# Patient Record
Sex: MALE | Race: WHITE | NOT HISPANIC OR LATINO | Employment: FULL TIME | URBAN - METROPOLITAN AREA
[De-identification: names, ages, dates, MRNs, and addresses within clinical notes are randomized per-mention and may not be internally consistent; named-entity substitution may affect disease eponyms.]

---

## 2017-01-31 ENCOUNTER — OFFICE VISIT (OUTPATIENT)
Dept: PULMONOLOGY | Facility: HOSPICE | Age: 57
End: 2017-01-31
Payer: COMMERCIAL

## 2017-01-31 ENCOUNTER — NON-PROVIDER VISIT (OUTPATIENT)
Dept: PULMONOLOGY | Facility: HOSPICE | Age: 57
End: 2017-01-31
Payer: COMMERCIAL

## 2017-01-31 VITALS
BODY MASS INDEX: 32.08 KG/M2 | HEIGHT: 75 IN | OXYGEN SATURATION: 96 % | HEART RATE: 96 BPM | WEIGHT: 258 LBS | SYSTOLIC BLOOD PRESSURE: 130 MMHG | TEMPERATURE: 98.5 F | DIASTOLIC BLOOD PRESSURE: 82 MMHG | RESPIRATION RATE: 16 BRPM

## 2017-01-31 DIAGNOSIS — F17.200 SMOKING: ICD-10-CM

## 2017-01-31 DIAGNOSIS — J44.9 CHRONIC OBSTRUCTIVE PULMONARY DISEASE, UNSPECIFIED COPD TYPE (HCC): ICD-10-CM

## 2017-01-31 DIAGNOSIS — J45.30 MILD PERSISTENT ASTHMA WITHOUT COMPLICATION: ICD-10-CM

## 2017-01-31 PROCEDURE — 99214 OFFICE O/P EST MOD 30 MIN: CPT | Performed by: INTERNAL MEDICINE

## 2017-01-31 RX ORDER — HYDROCODONE BITARTRATE AND ACETAMINOPHEN 5; 325 MG/1; MG/1
TABLET ORAL
Refills: 0 | COMMUNITY
Start: 2016-12-01 | End: 2017-10-24

## 2017-01-31 RX ORDER — ETODOLAC 400 MG/1
TABLET, FILM COATED ORAL
Refills: 1 | COMMUNITY
Start: 2016-12-01 | End: 2017-10-24

## 2017-01-31 RX ORDER — METAXALONE 800 MG/1
TABLET ORAL
Refills: 1 | COMMUNITY
Start: 2016-12-01 | End: 2017-10-24

## 2017-01-31 ASSESSMENT — PULMONARY FUNCTION TESTS
FEV1/FVC_PERCENT_PREDICTED: 76
FEV1_PREDICTED: 4.4
FEV1/FVC_PERCENT_CHANGE: -200
FVC: 4.39
FEV1/FVC_PERCENT_PREDICTED: 104
FEV1/FVC: 79.27
FEV1: 3.48
FEV1_PERCENT_PREDICTED: 79
FVC_PREDICTED: 5.76
FEV1_PERCENT_CHANGE: 2
FVC_PERCENT_PREDICTED: 77
FEV1_PERCENT_CHANGE: -1
FEV1_PERCENT_PREDICTED: 77
FVC_PERCENT_PREDICTED: 76
FEV1/FVC_PERCENT_PREDICTED: 100
FEV1/FVC: 77
FVC: 4.45
FEV1: 3.41

## 2017-01-31 NOTE — MR AVS SNAPSHOT
"        Alan Prabhakar Jr.   2017 9:00 AM   Non-Provider Visit   MRN: [id number]    Department:  [facility]   Dept Phone:  [phone]    Description:  Male : 1960   Provider:  Tristen Trinh M.D.           Reason for Visit     COPD           Allergies as of 2017     Allergen Noted Reactions    Codeine 2009   Hives, Nausea    Hydrocodone 2016   Itching    Pcn [Penicillins] 2009   Unspecified    Pt states \"It's a childhood allergies\".       You were diagnosed with     Chronic obstructive pulmonary disease, unspecified COPD type (CMS-HCC)   [3133973]         Vital Signs     Smoking Status                   Former Smoker           Basic Information     Date Of Birth Sex Race Ethnicity Preferred Language    1960 Male White Non- English      Your appointments     2017 10:20 AM   Established Patient Pul with Tristen Trinh M.D.   [facility] (--)    [address] [facility] [address]  [facility] [id number] [phone]              Problem List              ICD-10-CM Priority Class Noted - Resolved    Smoker F17.200   2012 - Present    Asthma J45.909   2012 - Present    Hypertension I10   2012 - Present    Anxiety F41.9   2012 - Present    Chronic lower back pain M54.5, G89.29   2012 - Present    Allergic rhinitis J30.9   2013 - Present    BMI 30.0-30.9,adult Z68.30   3/17/2015 - Present    Tubular adenoma of colon D12.6   2015 - Present    Pain R52   2016 - Present    Pain of upper abdomen R10.10   2016 - Present    Chest pain R07.9   2016 - Present    Gastroesophageal reflux disease without esophagitis K21.9   2016 - Present    Flank pain R10.9   2016 - Present    Hiatal hernia K44.9   2016 - Present    Nephrolithiasis N20.0   2016 - Present    COPD (chronic obstructive pulmonary disease) (CMS-HCC) J44.9   10/5/2016 - Present      Health Maintenance        Date Due " Completion Dates    IMM PNEUMOCOCCAL 19-64 (ADULT) MEDIUM RISK SERIES (1 of 1 - PPSV23) 12/2/1979 ---    COLONOSCOPY 4/18/2020 4/18/2015    IMM DTaP/Tdap/Td Vaccine (2 - Td) 3/17/2025 3/17/2015            Current Immunizations     Influenza TIV (IM) 10/11/2016    Tdap Vaccine 3/17/2015      Below and/or attached are the medications your provider expects you to take. Review all of your home medications and newly ordered medications with your provider and/or pharmacist. Follow medication instructions as directed by your provider and/or pharmacist. Please keep your medication list with you and share with your provider. Update the information when medications are discontinued, doses are changed, or new medications (including over-the-counter products) are added; and carry medication information at all times in the event of emergency situations     Allergies:  CODEINE - Hives,Nausea     HYDROCODONE - Itching     PCN - Unspecified               Medications  Valid as of: January 31, 2017 -  9:49 AM    Generic Name Brand Name Tablet Size Instructions for use    Albuterol Sulfate (Aero Soln) albuterol 108 (90 BASE) MCG/ACT Inhale 2 Puffs by mouth every 6 hours as needed for Shortness of Breath.        ALPRAZolam (Tab) XANAX 0.25 MG Take 1 Tab by mouth 1 time daily as needed for Anxiety.        Etodolac (Tab) LODINE 400 MG TK 1 T PO TID WITH FOOD        Fluticasone Propionate (Suspension) FLONASE 50 MCG/ACT Spray 1 Spray in nose every day. Each Nostril        Hydrocodone-Acetaminophen (Tab) NORCO 5-325 MG TK 1 T PO Q 4 TO 6 HOURS PRN P WHEN NOT WORKING OR DRIVING        Ibuprofen (Tab) MOTRIN 600 MG Take 600 mg by mouth every 6 hours as needed for Mild Pain.        Ipratropium-Albuterol (Solution) DUONEB 0.5-2.5 (3) MG/3ML 3 mL by Nebulization route every 6 hours as needed for Shortness of Breath.        LevoFLOXacin (Tab) LEVAQUIN 500 MG Take 1 Tab by mouth every 24 hours.        Losartan Potassium-HCTZ (Tab) HYZAAR 50-12.5  MG Take 1 Tab by mouth every evening.        Metaxalone (Tab) SKELAXIN 800 MG         Omeprazole (CAPSULE DELAYED RELEASE) PRILOSEC 20 MG TAKE ONE CAPSULE BY MOUTH TWICE DAILY        PredniSONE (Tab) DELTASONE 20 MG Take 1 Tab by mouth every day.        Sucralfate (Suspension) CARAFATE 1 GM/10ML Take 10 mL by mouth every 6 hours.        .                 Medicines prescribed today were sent to:     [facility] [id number] [place] [address] [address] [facility] OF [facility]. & [place]    [address] [facility] [address]    Phone: [phone] Fax: [phone]    Open 24 Hours?: No    [facility] [address] [facility], [place] - [address] [facility]    [address] [facility] [address]    Phone: [phone] Fax: [phone]    Open 24 Hours?: No      Medication refill instructions:       If your prescription bottle indicates you have medication refills left, it is not necessary to call your provider’s office. Please contact your pharmacy and they will refill your medication.    If your prescription bottle indicates you do not have any refills left, you may request refills at any time through one of the following ways: The online [employer] system (except Urgent Care), by calling your provider’s office, or by asking your pharmacy to contact your provider’s office with a refill request. Medication refills are processed only during regular business hours and may not be available until the next business day. Your provider may request additional information or to have a follow-up visit with you prior to refilling your medication.   *Please Note: Medication refills are assigned a new Rx number when refilled electronically. Your pharmacy may indicate that no refills were authorized even though a new prescription for the same medication is available at the pharmacy. Please request the medicine by name with the pharmacy before contacting your provider for a refill.           [employer] Access Code: Activation code  not generated  Current MyChart Status: Active

## 2017-01-31 NOTE — MR AVS SNAPSHOT
"        Alan Prabhakar JrLi   2017 10:20 AM   Office Visit   MRN: [id number]    Department:  [facility]   Dept Phone:  [phone]    Description:  Male : 1960   Provider:  Tristen Trinh M.D.           Reason for Visit     Follow-Up with PFT      Allergies as of 2017     Allergen Noted Reactions    Codeine 2009   Hives, Nausea    Hydrocodone 2016   Itching    Pcn [Penicillins] 2009   Unspecified    Pt states \"It's a childhood allergies\".       You were diagnosed with     Mild persistent asthma without complication   [952286]         Vital Signs     Blood Pressure Pulse Temperature Respirations Height Weight    130/82 mmHg 96 36.9 °C (98.5 °F) 16 1.905 m (6' 3\") 117.028 kg (258 lb)    Body Mass Index Oxygen Saturation Smoking Status             32.25 kg/m2 96% Former Smoker         Basic Information     Date Of Birth Sex Race Ethnicity Preferred Language    1960 Male White Non- English      Problem List              ICD-10-CM Priority Class Noted - Resolved    Smoker F17.200   2012 - Present    Asthma J45.909   2012 - Present    Hypertension I10   2012 - Present    Anxiety F41.9   2012 - Present    Chronic lower back pain M54.5, G89.29   2012 - Present    Allergic rhinitis J30.9   2013 - Present    BMI 30.0-30.9,adult Z68.30   3/17/2015 - Present    Tubular adenoma of colon D12.6   2015 - Present    Pain R52   2016 - Present    Pain of upper abdomen R10.10   2016 - Present    Chest pain R07.9   2016 - Present    Gastroesophageal reflux disease without esophagitis K21.9   2016 - Present    Flank pain R10.9   2016 - Present    Hiatal hernia K44.9   2016 - Present    Nephrolithiasis N20.0   2016 - Present    COPD (chronic obstructive pulmonary disease) (CMS-[facility]) J44.9   10/5/2016 - Present      Health Maintenance        Date Due Completion Dates    IMM PNEUMOCOCCAL 19-64 (ADULT) MEDIUM RISK " SERIES (1 of 1 - PPSV23) 12/2/1979 ---    COLONOSCOPY 4/18/2020 4/18/2015    IMM DTaP/Tdap/Td Vaccine (2 - Td) 3/17/2025 3/17/2015            Current Immunizations     Influenza TIV (IM) 10/11/2016    Tdap Vaccine 3/17/2015      Below and/or attached are the medications your provider expects you to take. Review all of your home medications and newly ordered medications with your provider and/or pharmacist. Follow medication instructions as directed by your provider and/or pharmacist. Please keep your medication list with you and share with your provider. Update the information when medications are discontinued, doses are changed, or new medications (including over-the-counter products) are added; and carry medication information at all times in the event of emergency situations     Allergies:  CODEINE - Hives,Nausea     HYDROCODONE - Itching     PCN - Unspecified               Medications  Valid as of: January 31, 2017 - 10:39 AM    Generic Name Brand Name Tablet Size Instructions for use    Albuterol Sulfate (Aero Soln) albuterol 108 (90 BASE) MCG/ACT Inhale 2 Puffs by mouth every 6 hours as needed for Shortness of Breath.        ALPRAZolam (Tab) XANAX 0.25 MG Take 1 Tab by mouth 1 time daily as needed for Anxiety.        Etodolac (Tab) LODINE 400 MG TK 1 T PO TID WITH FOOD        Fluticasone Propionate (Suspension) FLONASE 50 MCG/ACT Spray 1 Spray in nose every day. Each Nostril        Hydrocodone-Acetaminophen (Tab) NORCO 5-325 MG TK 1 T PO Q 4 TO 6 HOURS PRN P WHEN NOT WORKING OR DRIVING        Ibuprofen (Tab) MOTRIN 600 MG Take 600 mg by mouth every 6 hours as needed for Mild Pain.        Ipratropium-Albuterol (Solution) DUONEB 0.5-2.5 (3) MG/3ML 3 mL by Nebulization route every 6 hours as needed for Shortness of Breath.        LevoFLOXacin (Tab) LEVAQUIN 500 MG Take 1 Tab by mouth every 24 hours.        Losartan Potassium-HCTZ (Tab) HYZAAR 50-12.5 MG Take 1 Tab by mouth every evening.        Metaxalone (Tab)  SKELAXIN 800 MG         Omeprazole (CAPSULE DELAYED RELEASE) PRILOSEC 20 MG TAKE ONE CAPSULE BY MOUTH TWICE DAILY        PredniSONE (Tab) DELTASONE 20 MG Take 1 Tab by mouth every day.        Sucralfate (Suspension) CARAFATE 1 GM/10ML Take 10 mL by mouth every 6 hours.        .                 Medicines prescribed today were sent to:     [facility] [id number] [place] [address] [address] [facility] OF [facility]. & [place]    [address] [facility] [address]    Phone: [phone] Fax: [phone]    Open 24 Hours?: No    [facility] [address] [facility], [place] - [address] [facility]    [address] [facility] [address]    Phone: [phone] Fax: [phone]    Open 24 Hours?: No      Medication refill instructions:       If your prescription bottle indicates you have medication refills left, it is not necessary to call your provider’s office. Please contact your pharmacy and they will refill your medication.    If your prescription bottle indicates you do not have any refills left, you may request refills at any time through one of the following ways: The online [employer] system (except Urgent Care), by calling your provider’s office, or by asking your pharmacy to contact your provider’s office with a refill request. Medication refills are processed only during regular business hours and may not be available until the next business day. Your provider may request additional information or to have a follow-up visit with you prior to refilling your medication.   *Please Note: Medication refills are assigned a new Rx number when refilled electronically. Your pharmacy may indicate that no refills were authorized even though a new prescription for the same medication is available at the pharmacy. Please request the medicine by name with the pharmacy before contacting your provider for a refill.           [employer] Access Code: Activation code not generated  Current [employer] Status: Active

## 2017-01-31 NOTE — PROCEDURES
Technician: GINA Penn    Technician Comment:  Good patient effort & cooperation.  The results of this test meet the ATS/ERS standards for acceptability & reproducibility.  Test was performed on the [employer] Body Plethysmograph-Elite DX system.  Predicted values were [facility], [place]-3 for spirometry, [facility] for DLCO, ITS for Lung Volumes.  The DLCO was uncorrected for Hgb.  A bronchodilator of Ventolin HFA -2puffs via spacer administered.    Interpretation:    Spirometry shows mild lung restriction and there was not a significant improvement after a bronchodilator. Lung volumes are consistent with a very mild degree of air trapping. The diffusion capacity test is within normal limits. Flow volume loops are within normal limits.

## 2017-01-31 NOTE — PROGRESS NOTES
Alan Prabhakar Jr. is a 56 y.o. male here for follow-up of recent testing.    History of Present Illness:    The patient is a 56-year-old male who was admitted to the hospital for a COPD exacerbation. He has been an active smoker. He has smoked over 40 years at least one pack per day. He quit smoking about 4 weeks ago. Pulmonary function tests were done today which show mild lung restriction on spirometry but there was not a significant improvement after a bronchodilator. Lung volumes are consistent with very mild air trapping. The diffusion capacity test was normal. The patient is using duo nebs. He is not having any cough or congestion today chest pains or pleurisy. The patient reports a long time history of asthma. He's been told by many physicians in the past that he had asthma.    Constitutional:  Negative for fever, chills, sweats, and fatigue.  Eyes:  Negative for eye pain and visual changes.  HENT:  Negative for tinnitus and hoarse voice.  Cardiovascular:  Negative for chest pain, leg swelling, syncope and orthopnea.  Respiratory:  See HPI for pertinent negatives  Sleep:  Negative for somnolence, loud snoring, sleep disturbance due to breathing, insomnia.  Gastrointestinal:  Negative for dysphagia, nausea and abdominal pain.  Heme/lymph:  Denies easy bruising, blood clots.  Musculoskeletal:  Negative for arthralgias, sore muscles and back pain.  Skin:  Negative for rash and color change.  Neurological:  Negative for headaches, lightheadedness and weakness.  Psychiatric:  Denies depression.    Current Outpatient Prescriptions   Medication Sig Dispense Refill   • beclomethasone (QVAR) 80 MCG/ACT inhaler Inhale 2 Puffs by mouth 2 times a day. Use spacer. Rinse mouth after each use. 1 Inhaler 6   • etodolac (LODINE) 400 MG tablet TK 1 T PO TID WITH FOOD  1   • hydrocodone-acetaminophen (NORCO) 5-325 MG Tab per tablet TK 1 T PO Q 4 TO 6 HOURS PRN P WHEN NOT WORKING OR DRIVING  0   • metaxalone (SKELAXIN)  800 MG Tab   1   • omeprazole (PRILOSEC) 20 MG delayed-release capsule TAKE ONE CAPSULE BY MOUTH TWICE DAILY 180 Cap 0   • albuterol 108 (90 BASE) MCG/ACT Aero Soln inhalation aerosol Inhale 2 Puffs by mouth every 6 hours as needed for Shortness of Breath. 8.5 g 3   • fluticasone (FLONASE) 50 MCG/ACT nasal spray Spray 1 Spray in nose every day. Each Nostril 1 Bottle 5   • alprazolam (XANAX) 0.25 MG Tab Take 1 Tab by mouth 1 time daily as needed for Anxiety. 30 Tab 2   • losartan-hydrochlorothiazide (HYZAAR) 50-12.5 MG per tablet Take 1 Tab by mouth every evening. 90 Tab 3   • predniSONE (DELTASONE) 20 MG Tab Take 1 Tab by mouth every day. (Patient not taking: Reported on 12/8/2016) 4 Tab 0   • levofloxacin (LEVAQUIN) 500 MG tablet Take 1 Tab by mouth every 24 hours. (Patient not taking: Reported on 12/8/2016) 5 Tab 0   • sucralfate (CARAFATE) 1 GM/10ML Suspension Take 10 mL by mouth every 6 hours. 560 mL 0   • ipratropium-albuterol (DUONEB) 0.5-2.5 (3) MG/3ML nebulizer solution 3 mL by Nebulization route every 6 hours as needed for Shortness of Breath. 30 Vial 5   • ibuprofen (MOTRIN) 600 MG Tab Take 600 mg by mouth every 6 hours as needed for Mild Pain.       No current facility-administered medications for this visit.       Social History   Substance Use Topics   • Smoking status: Former Smoker -- 2.00 packs/day for 37 years     Types: Cigarettes, Cigars     Quit date: 11/08/2016   • Smokeless tobacco: Former User      Comment: from 2 pack decreased to pack a day.  Started at 17yo. Patient is smoking one cigar   • Alcohol Use: 12.6 - 13.2 oz/week     20 Standard drinks or equivalent, 1-2 Cans of beer per week       Past Medical History   Diagnosis Date   • Hypertension    • Psychiatric disorder    • ASTHMA 9/7/2012   • Hypertension 9/7/2012   • Anxiety 9/7/2012   • Chronic lower back pain 9/7/2012   • Arthritis      knee   • BMI 30.0-30.9,adult 3/17/2015   • Influenza    • Tonsillitis        Past Surgical History  "  Procedure Laterality Date   • Inj,epidural/lumb/sac single  3/5/2012     Performed by GIAN ESCOBAR at SURGERY [facility] ORS   • Tonsillectomy  2004   • Knee arthroscopy  [id number]     left   • Shoulder arthroscopy  2004     left   • Knee arthroscopy  6/19/2013     Performed by Jim Davila M.D. at SURGERY [facility] ORS   • Medial meniscectomy  6/19/2013     Performed by Jim Davila M.D. at SURGERY [facility]       Allergies:  Codeine; Hydrocodone; and Pcn    Family History   Problem Relation Age of Onset   • Hypertension     • Lung Disease Mother    • Colon Cancer Father        Physical Examination    Filed Vitals:    01/31/17 0928   Height: 1.905 m (6' 3\")   Weight: 117.028 kg (258 lb)   Weight % change since last entry.: 0 %   BP: 130/82   Pulse: 96   BMI (Calculated): 32.25   Resp: 16   Temp: 36.9 °C (98.5 °F)       Physical Exam:  Constitutional:  Well developed and well nourished.  Head:  Normocephalic and atraumatic.  Nose:  Nose normal.  Mouth/Throat:  Oropharynx is clear and moist, no lesions.    Neck:  Normal range of motion.  Supple.  No JVD.  Cardiovascular:  Normal rate, regular rhythm, normal heart sounds. No edema  Pulmonary/Chest: No wheezing, rales or rhonchi.  Respiratory effort non labored  Musculoskeletal.  No muscular atrophy.  Lymphadenopathy:  No cervical or supraclavicular adenopathy  Neurological:  Alert and oriented.  Cranial nerves intact.  No focal deficits  Skin:  No rashes or ulcers.  Psyciatric:  Normal mood and affect.    Assessment and Plan:  1. Mild persistent asthma without complication  Despite the extensive smoking history pulmonary function tests are near normal. The patient carries a history of asthma although pulmonary function tests do not show a postbronchodilator response. I suspect that he has underlying asthma and would benefit from a controller inhaler. Given the fact that he had a recent exacerbation that required hospitalization I see no reason to do " a methacholine challenge and said it was simply treat him for asthma. I started him on Qvar 2 puffs twice a day and he continues albuterol inhaler as needed. He is already quit smoking. We'll see how he does with this regimen and we'll see him back in 3 months.  - beclomethasone (QVAR) 80 MCG/ACT inhaler; Inhale 2 Puffs by mouth 2 times a day. Use spacer. Rinse mouth after each use.  Dispense: 1 Inhaler; Refill: 6    2. Smoking  The patient has an extensive smoking history greater than 30 pack years. He is a candidate for lung cancer screening.  - REFERRAL TO LUNG CANCER SCREENING PROGRAM        Followup Return in about 3 months (around 4/30/2017) for follow up with the pulmonary physician.

## 2017-01-31 NOTE — PATIENT INSTRUCTIONS
1. We have prescribed Qvar inhaler to be taken twice a day. Please use the albuterol inhaler as needed  2. Congratulations on quitting smoking  3. We have made a referral to the lung cancer screening program  4. Recommend follow-up in 3 months

## 2017-02-28 ENCOUNTER — OFFICE VISIT (OUTPATIENT)
Dept: HEMATOLOGY ONCOLOGY | Facility: MEDICAL CENTER | Age: 57
End: 2017-02-28
Payer: COMMERCIAL

## 2017-02-28 VITALS
HEART RATE: 95 BPM | SYSTOLIC BLOOD PRESSURE: 146 MMHG | WEIGHT: 265.2 LBS | TEMPERATURE: 98.6 F | OXYGEN SATURATION: 95 % | HEIGHT: 75 IN | BODY MASS INDEX: 32.97 KG/M2 | RESPIRATION RATE: 16 BRPM | DIASTOLIC BLOOD PRESSURE: 92 MMHG

## 2017-02-28 DIAGNOSIS — F17.210 CIGARETTE SMOKER: ICD-10-CM

## 2017-02-28 PROCEDURE — G0296 VISIT TO DETERM LDCT ELIG: HCPCS | Performed by: NURSE PRACTITIONER

## 2017-02-28 ASSESSMENT — PAIN SCALES - GENERAL: PAINLEVEL: NO PAIN

## 2017-02-28 ASSESSMENT — ENCOUNTER SYMPTOMS
FEVER: 0
WEIGHT LOSS: 0
CHILLS: 0
COUGH: 0
SHORTNESS OF BREATH: 0
HEMOPTYSIS: 0
WHEEZING: 0
SPUTUM PRODUCTION: 0

## 2017-02-28 NOTE — MR AVS SNAPSHOT
"Alan Prabhakar JrLi   2017 10:30 AM   Office Visit   MRN: [id number]    Department:  [facility]   Dept Phone:  [phone]    Description:  Male : 1960   Provider:  MILI Medina           Reason for Visit     Other ILCS       Allergies as of 2017     Allergen Noted Reactions    Codeine 2009   Hives, Nausea    Hydrocodone 2016   Itching    Pcn [Penicillins] 2009   Unspecified    Pt states \"It's a childhood allergies\".       Vital Signs     Blood Pressure Pulse Temperature Respirations Height Weight    146/92 mmHg 95 37 °C (98.6 °F) 16 1.905 m (6' 3\") 120.294 kg (265 lb 3.2 oz)    Body Mass Index Oxygen Saturation Smoking Status             33.15 kg/m2 95% Current Every Day Smoker         Basic Information     Date Of Birth Sex Race Ethnicity Preferred Language    1960 Male White Non- English      Problem List              ICD-10-CM Priority Class Noted - Resolved    Smoker F17.200   2012 - Present    Asthma J45.909   2012 - Present    Hypertension I10   2012 - Present    Anxiety F41.9   2012 - Present    Chronic lower back pain M54.5, G89.29   2012 - Present    Allergic rhinitis J30.9   2013 - Present    BMI 30.0-30.9,adult Z68.30   3/17/2015 - Present    Tubular adenoma of colon D12.6   2015 - Present    Pain R52   2016 - Present    Pain of upper abdomen R10.10   2016 - Present    Chest pain R07.9   2016 - Present    Gastroesophageal reflux disease without esophagitis K21.9   2016 - Present    Flank pain R10.9   2016 - Present    Hiatal hernia K44.9   2016 - Present    Nephrolithiasis N20.0   2016 - Present    COPD (chronic obstructive pulmonary disease) (CMS-HCC) J44.9   10/5/2016 - Present      Health Maintenance        Date Due Completion Dates    IMM PNEUMOCOCCAL 19-64 (ADULT) MEDIUM RISK SERIES (1 of 1 - PPSV23) 1979 ---    COLONOSCOPY 2020    IMM " DTaP/Tdap/Td Vaccine (2 - Td) 3/17/2025 3/17/2015            Current Immunizations     Influenza TIV (IM) 10/11/2016    Tdap Vaccine 3/17/2015      Below and/or attached are the medications your provider expects you to take. Review all of your home medications and newly ordered medications with your provider and/or pharmacist. Follow medication instructions as directed by your provider and/or pharmacist. Please keep your medication list with you and share with your provider. Update the information when medications are discontinued, doses are changed, or new medications (including over-the-counter products) are added; and carry medication information at all times in the event of emergency situations     Allergies:  CODEINE - Hives,Nausea     HYDROCODONE - Itching     PCN - Unspecified               Medications  Valid as of: February 28, 2017 - 10:54 AM    Generic Name Brand Name Tablet Size Instructions for use    Albuterol Sulfate (Aero Soln) albuterol 108 (90 BASE) MCG/ACT Inhale 2 Puffs by mouth every 6 hours as needed for Shortness of Breath.        ALPRAZolam (Tab) XANAX 0.25 MG Take 1 Tab by mouth 1 time daily as needed for Anxiety.        Beclomethasone Dipropionate (Aero Soln) QVAR 80 MCG/ACT Inhale 2 Puffs by mouth 2 times a day. Use spacer. Rinse mouth after each use.        Etodolac (Tab) LODINE 400 MG TK 1 T PO TID WITH FOOD        Fluticasone Propionate (Suspension) FLONASE 50 MCG/ACT Spray 1 Spray in nose every day. Each Nostril        Hydrocodone-Acetaminophen (Tab) NORCO 5-325 MG TK 1 T PO Q 4 TO 6 HOURS PRN P WHEN NOT WORKING OR DRIVING        Ibuprofen (Tab) MOTRIN 600 MG Take 600 mg by mouth every 6 hours as needed for Mild Pain.        Ipratropium-Albuterol (Solution) DUONEB 0.5-2.5 (3) MG/3ML 3 mL by Nebulization route every 6 hours as needed for Shortness of Breath.        LevoFLOXacin (Tab) LEVAQUIN 500 MG Take 1 Tab by mouth every 24 hours.        Losartan Potassium-HCTZ (Tab) HYZAAR 50-12.5  MG Take 1 Tab by mouth every evening.        Metaxalone (Tab) SKELAXIN 800 MG         Omeprazole (CAPSULE DELAYED RELEASE) PRILOSEC 20 MG TAKE ONE CAPSULE BY MOUTH TWICE DAILY        PredniSONE (Tab) DELTASONE 20 MG Take 1 Tab by mouth every day.        Sucralfate (Suspension) CARAFATE 1 GM/10ML Take 10 mL by mouth every 6 hours.        .                 Medicines prescribed today were sent to:     [facility] [id number] [place] [address] [address] [facility] [address] [facility]. & [place]    [address] [facility] [address]    Phone: [phone] Fax: [phone]    Open 24 Hours?: No    [facility] [address] [facility], [place] [address] [facility]    [address] [facility] [address]    Phone: [phone] Fax: [phone]    Open 24 Hours?: No      Medication refill instructions:       If your prescription bottle indicates you have medication refills left, it is not necessary to call your provider’s office. Please contact your pharmacy and they will refill your medication.    If your prescription bottle indicates you do not have any refills left, you may request refills at any time through one of the following ways: The online [employer] system (except Urgent Care), by calling your provider’s office, or by asking your pharmacy to contact your provider’s office with a refill request. Medication refills are processed only during regular business hours and may not be available until the next business day. Your provider may request additional information or to have a follow-up visit with you prior to refilling your medication.   *Please Note: Medication refills are assigned a new Rx number when refilled electronically. Your pharmacy may indicate that no refills were authorized even though a new prescription for the same medication is available at the pharmacy. Please request the medicine by name with the pharmacy before contacting your provider for a refill.           [employer] Access Code: Activation code  not generated  Current MyChart Status: Active

## 2017-02-28 NOTE — PROGRESS NOTES
"Subjective:   Date of Consultation:2/28/2017 10:23 AM  Primary care physician:MILI Ellis    Patient seen today for initial lung cancer screening visit. Patient referred by Dr. Tristen Trinh.    The patient meets eligibility criteria including age, smoking history (30+ pack years), if former smoker, quit in the last 15 years, and absence of signs or symptoms of lung cancer.    - Age - 56  - Smoking history - Patient has smoked for 36 years at an average of 2 ppd = 80 pack year smoking history.  - Current smoking status - Current smoker. Patient is trying to quit.  - No symptoms of lung cancer and no previous history of lung cancer   Past Medical History   Diagnosis Date   • Hypertension    • Psychiatric disorder    • ASTHMA 9/7/2012   • Hypertension 9/7/2012   • Anxiety 9/7/2012   • Chronic lower back pain 9/7/2012   • Arthritis      knee   • BMI 30.0-30.9,adult 3/17/2015   • Influenza    • Tonsillitis      Past Surgical History   Procedure Laterality Date   • Inj,epidural/lumb/sac single  3/5/2012     Performed by GIAN ESCOBAR [facility]   • Tonsillectomy  2004   • Knee arthroscopy  [id number]     left   • Shoulder arthroscopy  2004     left   • Knee arthroscopy  6/19/2013     Performed by Jim Davila M.D. at [facility] ORS   • Medial meniscectomy  6/19/2013     Performed by Jim Davila M.D. at [facility] ORS     Allergies   Allergen Reactions   • Codeine Hives and Nausea   • Hydrocodone Itching   • Pcn [Penicillins] Unspecified     Pt states \"It's a childhood allergies\".         History   Smoking status   • Former Smoker -- 2.00 packs/day for 37 years   • Types: Cigarettes, Cigars   • Quit date: 11/08/2016   Smokeless tobacco   • Former User     Comment: from 2 pack decreased to pack a day.  Started at 15yo. Patient is smoking one cigar     History   Alcohol Use   • 12.6 - 13.2 oz/week   • 20 Standard drinks or equivalent, 1-2 Cans of beer per week "     History   Drug Use No          Family History   Problem Relation Age of Onset   • Hypertension     • Lung Disease Mother    • Colon Cancer Father        Review of Systems   Constitutional: Negative for fever, chills, weight loss and malaise/fatigue.   Respiratory: Negative for cough, hemoptysis, sputum production, shortness of breath and wheezing.         Objective:   There were no vitals taken for this visit.    Physical Exam   Constitutional: He is oriented to person, place, and time. No distress.   Patient not in acute distress   HENT:   Head: Normocephalic and atraumatic.   Mouth/Throat: Oropharynx is clear and moist and mucous membranes are normal. No oral lesions.   Eyes: EOM are normal. Pupils are equal, round, and reactive to light.   Neck: Normal range of motion. Neck supple.   Cardiovascular: Normal rate and regular rhythm.  Exam reveals no gallop and no friction rub.    Pulmonary/Chest: Effort normal and breath sounds normal. He has no decreased breath sounds. He has no wheezes.   Abdominal: Soft. Bowel sounds are normal. He exhibits no distension and no mass. There is no hepatosplenomegaly, splenomegaly or hepatomegaly. There is no tenderness.   Musculoskeletal: Normal range of motion. He exhibits no edema.   Lymphadenopathy:     He has no cervical adenopathy.     He has no axillary adenopathy.        Right: No inguinal and no supraclavicular adenopathy present.        Left: No inguinal and no supraclavicular adenopathy present.   Neurological: He is alert and oriented to person, place, and time. He has normal strength. No cranial nerve deficit. Gait normal.   Skin: He is not diaphoretic. No cyanosis.   Nursing note and vitals reviewed.      Assessment:     1. Cigarette smoker       Medical Decision Making:  Today's Assessment / Status / Plan:             We conducted a shared decision-making process using a decision aid. We reviewed benefits and harms of screening, including false positives and  potential need for additional diagnostic testing, the possibility of over diagnosis, and total radiation exposure.    We discussed the importance of adhering to annual LDCT screening. We also discussed the impact of comorbities on the patient's the ability or willingness to undergo diagnostic procedure(s) and treatment.    Counseling on the importance of maintaining cigarette smoking abstinence if former smoker; or the importance of smoking cessation if current smoker and, if appropriate, furnishing of information about tobacco cessation interventions.    Based on our discussion, we have decided to begin annual lung cancer screening starting now.

## 2017-03-09 ENCOUNTER — HOSPITAL ENCOUNTER (OUTPATIENT)
Dept: RADIOLOGY | Facility: MEDICAL CENTER | Age: 57
End: 2017-03-09
Attending: NURSE PRACTITIONER
Payer: COMMERCIAL

## 2017-03-09 DIAGNOSIS — F17.210 CIGARETTE SMOKER: ICD-10-CM

## 2017-03-09 PROCEDURE — G0297 LDCT FOR LUNG CA SCREEN: HCPCS

## 2017-03-13 ENCOUNTER — TELEPHONE (OUTPATIENT)
Dept: HEMATOLOGY ONCOLOGY | Facility: MEDICAL CENTER | Age: 57
End: 2017-03-13

## 2017-03-31 RX ORDER — OMEPRAZOLE 20 MG/1
CAPSULE, DELAYED RELEASE ORAL
Qty: 180 CAP | Refills: 1 | Status: SHIPPED | OUTPATIENT
Start: 2017-03-31 | End: 2017-09-21 | Stop reason: SDUPTHER

## 2017-04-13 ENCOUNTER — OFFICE VISIT (OUTPATIENT)
Dept: MEDICAL GROUP | Facility: PHYSICIAN GROUP | Age: 57
End: 2017-04-13
Payer: COMMERCIAL

## 2017-04-13 VITALS
BODY MASS INDEX: 32.28 KG/M2 | DIASTOLIC BLOOD PRESSURE: 82 MMHG | SYSTOLIC BLOOD PRESSURE: 148 MMHG | HEIGHT: 75 IN | TEMPERATURE: 98.1 F | RESPIRATION RATE: 16 BRPM | OXYGEN SATURATION: 98 % | WEIGHT: 259.6 LBS | HEART RATE: 100 BPM

## 2017-04-13 DIAGNOSIS — F41.9 ANXIETY: ICD-10-CM

## 2017-04-13 DIAGNOSIS — G47.00 INSOMNIA, UNSPECIFIED TYPE: ICD-10-CM

## 2017-04-13 DIAGNOSIS — I10 ESSENTIAL HYPERTENSION: Chronic | ICD-10-CM

## 2017-04-13 DIAGNOSIS — R52 PAIN: ICD-10-CM

## 2017-04-13 DIAGNOSIS — J30.9 ALLERGIC RHINITIS, UNSPECIFIED ALLERGIC RHINITIS TRIGGER, UNSPECIFIED RHINITIS SEASONALITY: ICD-10-CM

## 2017-04-13 DIAGNOSIS — E66.9 OBESITY (BMI 30-39.9): ICD-10-CM

## 2017-04-13 PROCEDURE — 99214 OFFICE O/P EST MOD 30 MIN: CPT | Performed by: NURSE PRACTITIONER

## 2017-04-13 RX ORDER — LOSARTAN POTASSIUM AND HYDROCHLOROTHIAZIDE 12.5; 5 MG/1; MG/1
1 TABLET ORAL EVERY EVENING
Qty: 90 TAB | Refills: 3 | Status: SHIPPED | OUTPATIENT
Start: 2017-04-13 | End: 2018-04-16 | Stop reason: SDUPTHER

## 2017-04-13 RX ORDER — HYDROXYZINE HYDROCHLORIDE 25 MG/1
50 TABLET, FILM COATED ORAL
Qty: 30 TAB | Refills: 1 | Status: SHIPPED | OUTPATIENT
Start: 2017-04-13 | End: 2017-10-24

## 2017-04-13 RX ORDER — TRIAMCINOLONE ACETONIDE 40 MG/ML
40 INJECTION, SUSPENSION INTRA-ARTICULAR; INTRAMUSCULAR ONCE
Status: COMPLETED | OUTPATIENT
Start: 2017-04-13 | End: 2017-04-13

## 2017-04-13 RX ORDER — ALPRAZOLAM 0.25 MG/1
0.25 TABLET ORAL
Qty: 30 TAB | Refills: 2 | Status: SHIPPED | OUTPATIENT
Start: 2017-04-13 | End: 2017-10-24 | Stop reason: SDUPTHER

## 2017-04-13 RX ADMIN — TRIAMCINOLONE ACETONIDE 40 MG: 40 INJECTION, SUSPENSION INTRA-ARTICULAR; INTRAMUSCULAR at 13:37

## 2017-04-13 NOTE — ASSESSMENT & PLAN NOTE
Patient has chronic back pain and is currently under the care of pain management. He states he is unable to sleep at night due to back pain and feels that he is a walking zombie. He has tried Ambien in the past and does not want narcotic are addictive medications. He had a recent work injury and is going to get epidurals in the thoracic spine. He is requesting a new referral to treat lower back pain as he has not had success with epidurals done by Dr. Morales. He is currently being treated by Dr. Jefferson for a Workman's Comp. claimfor upper back pain.He is going to email me next week for referral for the lower back pain, after consulting with Dr. Jefferson.

## 2017-04-13 NOTE — PROGRESS NOTES
Subjective:     Chief Complaint   Patient presents with   • Medication Refill   • Injections     Kenalog        HPI:  Alan Prabhakar Jr. is a 56 y.o. male here today to discuss the following:    Allergic rhinitis  Patient has chronic environmental allergies and uses Flonase spray is here today for Kenalog. Symptoms include runny nose, sneezing and itching watery eyes.     Anxiety  Chronic condition: Patient was treated for chronic anxiety with Xanax that he usually takes at bedtime. He denies any chest pain, shortness of breath, panic attacks, suicidal or homicidal ideation. He is requesting refills today.    BMI 30.0-30.9,adult  Patient is obese with a BMI of 32.55. He spends a lot of time driving and sedentary.    COPD (chronic obstructive pulmonary disease) (CMS-[facility])  Managed by pulmonary medicine    Pain  Patient has chronic back pain and is currently under the care of pain management. He states he is unable to sleep at night due to back pain and feels that he is a walking zombie. He has tried Ambien in the past and does not want narcotic are addictive medications. He had a recent work injury and is going to get epidurals in the thoracic spine. He is requesting a new referral to treat lower back pain as he has not had success with epidurals done by Dr. Morales. He is currently being treated by Dr. Jefferson for a Workman's Comp. claimfor upper back pain.He is going to email me next week for referral for the lower back pain, after consulting with Dr. Jefferson.         Current medicines (including changes today)  Current Outpatient Prescriptions   Medication Sig Dispense Refill   • GABAPENTIN PO Take  by mouth.     • Cyclobenzaprine HCl (FLEXERIL PO) Take  by mouth.     • hydrOXYzine (ATARAX) 25 MG Tab Take 2 Tabs by mouth every bedtime. 30 Tab 1   • alprazolam (XANAX) 0.25 MG Tab Take 1 Tab by mouth 1 time daily as needed for Anxiety. 30 Tab 2   • losartan-hydrochlorothiazide (HYZAAR) 50-12.5 MG per tablet Take 1  Tab by mouth every evening. 90 Tab 3   • omeprazole (PRILOSEC) 20 MG delayed-release capsule TAKE 1 CAPSULE BY MOUTH TWICE DAILY 180 Cap 1   • etodolac (LODINE) 400 MG tablet TK 1 T PO TID WITH FOOD  1   • hydrocodone-acetaminophen (NORCO) 5-325 MG Tab per tablet TK 1 T PO Q 4 TO 6 HOURS PRN P WHEN NOT WORKING OR DRIVING  0   • metaxalone (SKELAXIN) 800 MG Tab   1   • beclomethasone (QVAR) 80 MCG/ACT inhaler Inhale 2 Puffs by mouth 2 times a day. Use spacer. Rinse mouth after each use. 1 Inhaler 6   • albuterol 108 (90 BASE) MCG/ACT Aero Soln inhalation aerosol Inhale 2 Puffs by mouth every 6 hours as needed for Shortness of Breath. 8.5 g 3   • fluticasone (FLONASE) 50 MCG/ACT nasal spray Spray 1 Spray in nose every day. Each Nostril 1 Bottle 5   • sucralfate (CARAFATE) 1 GM/10ML Suspension Take 10 mL by mouth every 6 hours. 560 mL 0   • ipratropium-albuterol (DUONEB) 0.5-2.5 (3) MG/3ML nebulizer solution 3 mL by Nebulization route every 6 hours as needed for Shortness of Breath. 30 Vial 5   • ibuprofen (MOTRIN) 600 MG Tab Take 600 mg by mouth every 6 hours as needed for Mild Pain.       No current facility-administered medications for this visit.       He  has a past medical history of Hypertension; Psychiatric disorder; ASTHMA (9/7/2012); Hypertension (9/7/2012); Anxiety (9/7/2012); Chronic lower back pain (9/7/2012); Arthritis; BMI 30.0-30.9,adult (3/17/2015); Influenza; and Tonsillitis.    ROS   Review of Systems   Constitutional: Negative for fever, chills, weight loss and malaise/fatigue.   HENT: Negative for ear pain, nosebleeds, congestion, sore throat and neck pain.    Respiratory: Negative for cough, sputum production, shortness of breath and wheezing.    Cardiovascular: Negative for chest pain, palpitations,  and leg swelling.   Gastrointestinal: Negative for heartburn, nausea, vomiting, diarrhea and abdominal pain.   MS: Positive for chronic back pain  Neurological: Negative for dizziness, tingling,  "tremors, sensory change, focal weakness and headaches.   Psychiatric/Behavioral: Negative for depression,  suicidal ideas, and memory loss.  Positive fake satiety and insomnia  All other systems reviewed and are negative except as in HPI.     Objective:   Physical Exam:  Blood pressure 148/82, pulse 100, temperature 36.7 °C (98.1 °F), resp. rate 16, height 1.905 m (6' 3\"), weight 117.754 kg (259 lb 9.6 oz), SpO2 98 %. Body mass index is 32.45 kg/(m^2).   Physical Exam:  Alert, oriented in no acute distress.  Eye contact is good, speech goal directed, affect calm  HEENT: conjunctiva non-injected, sclera non-icteric.  Pinna normal.   Neck No adenopathy or masses in the neck or supraclavicular regions.  Lungs: clear to auscultation bilaterally with good excursion.  CV: regular rate and rhythm.   Abdomen: soft, nontender, No CVAT. Normal bowel sounds.  Ext: no edema, color normal, vascularity normal, temperature normal  MS: Normal gait and station    Assessment and Plan:   The following treatment plan was discussed   1. Insomnia, unspecified type  hydrOXYzine (ATARAX) 25 MG Tab   2. Allergic rhinitis, unspecified allergic rhinitis trigger, unspecified rhinitis seasonality  triamcinolone acetonide (KENALOG-40) injection 40 mg   3. Anxiety  alprazolam (XANAX) 0.25 MG Tab    Continue current medication   4. Essential hypertension  losartan-hydrochlorothiazide (HYZAAR) 50-12.5 MG per tablet    Continue current medication.   5. Obesity (BMI 30-39.9)  Patient identified as having weight management issue.  Appropriate orders and counseling given.   6. BMI 30.0-30.9,adult     7. Pain      patient is given a trial hydroxyzine for anxiety and sleep.    Followup: Return in about 6 months (around 10/13/2017).   Please note that this dictation was created using voice recognition software. I have made every reasonable attempt to correct obvious errors, but I expect that there are errors of grammar and possibly content that I did not " discover before finalizing the note.

## 2017-04-13 NOTE — ASSESSMENT & PLAN NOTE
Chronic condition: Patient was treated for chronic anxiety with Xanax that he usually takes at bedtime. He denies any chest pain, shortness of breath, panic attacks, suicidal or homicidal ideation. He is requesting refills today.

## 2017-04-13 NOTE — MR AVS SNAPSHOT
"        Alan Prabhakar Jr.   2017 1:00 PM   Office Visit   MRN: [id number]    Department:  [facility]   Dept Phone:  [phone]    Description:  Male : 1960   Provider:  MILI Ellis           Reason for Visit     Medication Refill     Injections Kenalog       Allergies as of 2017     Allergen Noted Reactions    Codeine 2009   Hives, Nausea    Hydrocodone 2016   Itching    Pcn [Penicillins] 2009   Unspecified    Pt states \"It's a childhood allergies\".       You were diagnosed with     Insomnia, unspecified type   [7264208]       Allergic rhinitis, unspecified allergic rhinitis trigger, unspecified rhinitis seasonality   [6168637]       Anxiety   [856279]   Continue current medication    Essential hypertension   [1403258]   Continue current medication.    Obesity (BMI 30-39.9)   [123530]         Vital Signs     Blood Pressure Pulse Temperature Respirations Height Weight    148/82 mmHg 100 36.7 °C (98.1 °F) 16 1.905 m (6' 3\") 117.754 kg (259 lb 9.6 oz)    Body Mass Index Oxygen Saturation Smoking Status             32.45 kg/m2 98% Current Every Day Smoker         Basic Information     Date Of Birth Sex Race Ethnicity Preferred Language    1960 Male White Non- English      Problem List              ICD-10-CM Priority Class Noted - Resolved    Smoker F17.200   2012 - Present    Asthma J45.909   2012 - Present    Hypertension I10   2012 - Present    Anxiety F41.9   2012 - Present    Chronic lower back pain M54.5, G89.29   2012 - Present    Allergic rhinitis J30.9   2013 - Present    BMI 30.0-30.9,adult Z68.30   3/17/2015 - Present    Tubular adenoma of colon D12.6   2015 - Present    Pain R52   2016 - Present    Pain of upper abdomen R10.10   2016 - Present    Chest pain R07.9   2016 - Present    Gastroesophageal reflux disease without esophagitis K21.9   2016 - Present    Flank pain R10.9 "   9/22/2016 - Present    Hiatal hernia K44.9   9/27/2016 - Present    Nephrolithiasis N20.0   9/27/2016 - Present    COPD (chronic obstructive pulmonary disease) (CMS-[facility]) J44.9   10/5/2016 - Present    Cigarette smoker F17.210   2/28/2017 - Present    Obesity (BMI 30-39.9) E66.9   4/13/2017 - Present      Health Maintenance        Date Due Completion Dates    IMM PNEUMOCOCCAL 19-64 (ADULT) MEDIUM RISK SERIES (1 of 1 - PPSV23) 12/2/1979 ---    COLONOSCOPY 4/18/2020 4/18/2015    IMM DTaP/Tdap/Td Vaccine (2 - Td) 3/17/2025 3/17/2015            Current Immunizations     Influenza TIV (IM) 10/11/2016    Tdap Vaccine 3/17/2015      Below and/or attached are the medications your provider expects you to take. Review all of your home medications and newly ordered medications with your provider and/or pharmacist. Follow medication instructions as directed by your provider and/or pharmacist. Please keep your medication list with you and share with your provider. Update the information when medications are discontinued, doses are changed, or new medications (including over-the-counter products) are added; and carry medication information at all times in the event of emergency situations     Allergies:  CODEINE - Hives,Nausea     HYDROCODONE - Itching     PCN - Unspecified               Medications  Valid as of: April 13, 2017 -  1:39 PM    Generic Name Brand Name Tablet Size Instructions for use    Albuterol Sulfate (Aero Soln) albuterol 108 (90 BASE) MCG/ACT Inhale 2 Puffs by mouth every 6 hours as needed for Shortness of Breath.        ALPRAZolam (Tab) XANAX 0.25 MG Take 1 Tab by mouth 1 time daily as needed for Anxiety.        Beclomethasone Dipropionate (Aero Soln) QVAR 80 MCG/ACT Inhale 2 Puffs by mouth 2 times a day. Use spacer. Rinse mouth after each use.        Cyclobenzaprine HCl   Take  by mouth.        Etodolac (Tab) LODINE 400 MG TK 1 T PO TID WITH FOOD        Fluticasone Propionate (Suspension) FLONASE 50 MCG/ACT  Spray 1 Spray in nose every day. Each Nostril        Gabapentin   Take  by mouth.        Hydrocodone-Acetaminophen (Tab) NORCO 5-325 MG TK 1 T PO Q 4 TO 6 HOURS PRN P WHEN NOT WORKING OR DRIVING        HydrOXYzine HCl (Tab) ATARAX 25 MG Take 2 Tabs by mouth every bedtime.        Ibuprofen (Tab) MOTRIN 600 MG Take 600 mg by mouth every 6 hours as needed for Mild Pain.        Ipratropium-Albuterol (Solution) DUONEB 0.5-2.5 (3) MG/3ML 3 mL by Nebulization route every 6 hours as needed for Shortness of Breath.        Losartan Potassium-HCTZ (Tab) HYZAAR 50-12.5 MG Take 1 Tab by mouth every evening.        Metaxalone (Tab) SKELAXIN 800 MG         Omeprazole (CAPSULE DELAYED RELEASE) PRILOSEC 20 MG TAKE 1 CAPSULE BY MOUTH TWICE DAILY        Sucralfate (Suspension) CARAFATE 1 GM/10ML Take 10 mL by mouth every 6 hours.        .                 Medicines prescribed today were sent to:     [facility] [address] [place] [address] AT [facility]    [address] [facility] [address]    Phone: [phone] Fax: [phone]    Open 24 Hours?: No    [facility] [address] [facility] [address] [facility]    [address] [facility] [address]    Phone: [phone] Fax: [phone]    Open 24 Hours?: No      Medication refill instructions:       If your prescription bottle indicates you have medication refills left, it is not necessary to call your provider’s office. Please contact your pharmacy and they will refill your medication.    If your prescription bottle indicates you do not have any refills left, you may request refills at any time through one of the following ways: The online [employer] system (except Urgent Care), by calling your provider’s office, or by asking your pharmacy to contact your provider’s office with a refill request. Medication refills are processed only during regular business hours and may not be available until the next business day. Your provider may  request additional information or to have a follow-up visit with you prior to refilling your medication.   *Please Note: Medication refills are assigned a new Rx number when refilled electronically. Your pharmacy may indicate that no refills were authorized even though a new prescription for the same medication is available at the pharmacy. Please request the medicine by name with the pharmacy before contacting your provider for a refill.           MyChart Access Code: Activation code not generated  Current MyChart Status: Active          Quit Tobacco Information     Do you want to quit using tobacco?    Quitting tobacco decreases risks of cancer, heart and lung disease, increases life expectancy, improves sense of taste and smell, and increases spending money, among other benefits.    If you are thinking about quitting, we can help.  • [facility] Quit Tobacco Program: 225.976.7795  o Program occurs weekly for four weeks and includes pharmacist consultation on products to support quitting smoking or chewing tobacco. A provider referral is needed for pharmacist consultation.  • Tobacco Users Help Hotline: 5-853-QUIT-NOW (448-2003) or https://[place].quitlogix.org/  o Free, confidential telephone and online coaching for [place] residents. Sessions are designed on a schedule that is convenient for you. Eligible clients receive free nicotine replacement therapy.  • Nationally: www.smokefree.gov  o Information and professional assistance to support both immediate and long-term needs as you become, and remain, a non-smoker. Smokefree.gov allows you to choose the help that best fits your needs.

## 2017-04-13 NOTE — ASSESSMENT & PLAN NOTE
Patient has chronic environmental allergies and uses Flonase spray is here today for Kenalog. Symptoms include runny nose, sneezing and itching watery eyes.

## 2017-09-22 NOTE — TELEPHONE ENCOUNTER
Was the patient seen in the last year in this department? Yes     Does patient have an active prescription for medications requested? No     Received Request Via: Pharmacy      Pt met protocol?: Yes    LAST OV 04/13/2017

## 2017-09-24 RX ORDER — OMEPRAZOLE 20 MG/1
CAPSULE, DELAYED RELEASE ORAL
Qty: 180 CAP | Refills: 1 | Status: SHIPPED | OUTPATIENT
Start: 2017-09-24 | End: 2018-06-20 | Stop reason: SDUPTHER

## 2017-10-12 DIAGNOSIS — J30.1 ALLERGIC RHINITIS DUE TO POLLEN, UNSPECIFIED CHRONICITY, UNSPECIFIED SEASONALITY: ICD-10-CM

## 2017-10-12 DIAGNOSIS — J30.1 ALLERGIC RHINITIS DUE TO POLLEN: ICD-10-CM

## 2017-10-13 NOTE — TELEPHONE ENCOUNTER
Was the patient seen in the last year in this department? Yes     Does patient have an active prescription for medications requested? No     Received Request Via: Pharmacy      Pt met protocol?: Yes     Last OV 04/2017

## 2017-10-16 RX ORDER — FLUTICASONE PROPIONATE 50 MCG
SPRAY, SUSPENSION (ML) NASAL
Qty: 3 BOTTLE | Refills: 1 | Status: SHIPPED | OUTPATIENT
Start: 2017-10-16 | End: 2023-02-25

## 2017-10-24 ENCOUNTER — OFFICE VISIT (OUTPATIENT)
Dept: MEDICAL GROUP | Facility: PHYSICIAN GROUP | Age: 57
End: 2017-10-24
Payer: COMMERCIAL

## 2017-10-24 VITALS
OXYGEN SATURATION: 96 % | DIASTOLIC BLOOD PRESSURE: 88 MMHG | TEMPERATURE: 98.1 F | BODY MASS INDEX: 31.71 KG/M2 | RESPIRATION RATE: 16 BRPM | SYSTOLIC BLOOD PRESSURE: 126 MMHG | HEIGHT: 75 IN | HEART RATE: 110 BPM | WEIGHT: 255 LBS

## 2017-10-24 DIAGNOSIS — J30.9 ACUTE ALLERGIC RHINITIS, UNSPECIFIED SEASONALITY, UNSPECIFIED TRIGGER: ICD-10-CM

## 2017-10-24 DIAGNOSIS — F17.200 SMOKER: ICD-10-CM

## 2017-10-24 DIAGNOSIS — J45.30 MILD PERSISTENT ASTHMA WITHOUT COMPLICATION: ICD-10-CM

## 2017-10-24 DIAGNOSIS — L91.8 CUTANEOUS SKIN TAGS: ICD-10-CM

## 2017-10-24 DIAGNOSIS — F41.9 ANXIETY: ICD-10-CM

## 2017-10-24 DIAGNOSIS — I10 ESSENTIAL HYPERTENSION: ICD-10-CM

## 2017-10-24 DIAGNOSIS — G89.29 CHRONIC BILATERAL LOW BACK PAIN WITH RIGHT-SIDED SCIATICA: ICD-10-CM

## 2017-10-24 DIAGNOSIS — Z13.6 SCREENING FOR CARDIOVASCULAR, RESPIRATORY, AND GENITOURINARY DISEASES: ICD-10-CM

## 2017-10-24 DIAGNOSIS — M54.41 CHRONIC BILATERAL LOW BACK PAIN WITH RIGHT-SIDED SCIATICA: ICD-10-CM

## 2017-10-24 DIAGNOSIS — Z13.83 SCREENING FOR CARDIOVASCULAR, RESPIRATORY, AND GENITOURINARY DISEASES: ICD-10-CM

## 2017-10-24 DIAGNOSIS — Z13.89 SCREENING FOR CARDIOVASCULAR, RESPIRATORY, AND GENITOURINARY DISEASES: ICD-10-CM

## 2017-10-24 PROCEDURE — 99214 OFFICE O/P EST MOD 30 MIN: CPT | Mod: 25 | Performed by: NURSE PRACTITIONER

## 2017-10-24 PROCEDURE — 11200 RMVL SKIN TAGS UP TO&INC 15: CPT | Performed by: NURSE PRACTITIONER

## 2017-10-24 RX ORDER — TRIAMCINOLONE ACETONIDE 40 MG/ML
40 INJECTION, SUSPENSION INTRA-ARTICULAR; INTRAMUSCULAR ONCE
Status: COMPLETED | OUTPATIENT
Start: 2017-10-24 | End: 2017-10-24

## 2017-10-24 RX ORDER — ALPRAZOLAM 0.25 MG/1
0.25 TABLET ORAL
Qty: 30 TAB | Refills: 2 | Status: SHIPPED | OUTPATIENT
Start: 2017-10-24 | End: 2023-02-25

## 2017-10-24 RX ADMIN — TRIAMCINOLONE ACETONIDE 40 MG: 40 INJECTION, SUSPENSION INTRA-ARTICULAR; INTRAMUSCULAR at 16:27

## 2017-10-24 ASSESSMENT — PATIENT HEALTH QUESTIONNAIRE - PHQ9: CLINICAL INTERPRETATION OF PHQ2 SCORE: 0

## 2017-10-24 NOTE — ASSESSMENT & PLAN NOTE
Chronic condition: patient has been treated for chronic anxiety with Xanax. He states that he usually takes it at bedtime every 4-5th night. He denies any recent panic attacks, suicidal or homicidal ideation. He is requesting refills today.

## 2017-10-24 NOTE — ASSESSMENT & PLAN NOTE
Patient is currently smoking. He states that he is not smoking as much as he is used to because he has been on light duty, and he smokes more when he is working.

## 2017-10-24 NOTE — ASSESSMENT & PLAN NOTE
Patient has chronic environmental allergies and currently uses Flonase as needed. Symptoms included many nose, sneezing and watery eyes. He is requesting a  Kenalog injection today

## 2017-10-24 NOTE — ASSESSMENT & PLAN NOTE
Patient has chronic low back pain with sciatic down right leg to ankle. He is under the care of Dr. Jefferson at pain management. This is a workman's comp claim. He is requesting lab work to check his kidneys and liver due to various medications that he's been taking.

## 2017-10-24 NOTE — ASSESSMENT & PLAN NOTE
Chronic condition: Patient is treated for hypertension with losartan-HCTZ 50-12 0.5 mg daily patient is well controlled. Blood pressure today is 126/88.  He denies any chest pain, diaphoresis, shortness of breath, headaches, dizziness or blurred vision.

## 2017-10-24 NOTE — PROGRESS NOTES
Subjective:     Chief Complaint   Patient presents with   • Orders Needed     labs    • Injections     Kenalog    • Nevus       HPI:  Alan Prabhakar Jr. is a 56 y.o. male here today to discuss the following:    Chronic lower back pain  Patient has chronic low back pain with sciatic down right leg to ankle. He is under the care of Dr. Jefferson at pain management. This is a workman's comp claim. He is requesting lab work to check his kidneys and liver due to various medications that he's been taking.    Anxiety  Chronic condition: patient has been treated for chronic anxiety with Xanax. He states that he usually takes it at bedtime every 4-5th night. He denies any recent panic attacks, suicidal or homicidal ideation. He is requesting refills today.    Hypertension  Chronic condition: Patient is treated for hypertension with losartan-HCTZ 50-12 0.5 mg daily patient is well controlled. Blood pressure today is 126/88.  He denies any chest pain, diaphoresis, shortness of breath, headaches, dizziness or blurred vision.     Cutaneous skin tags  Patient has multiple cutaneous skin tags he would like removed, due to irritation by his collar.    Allergic rhinitis  Patient has chronic environmental allergies and currently uses Flonase as needed. Symptoms included many nose, sneezing and watery eyes. He is requesting a  Kenalog injection today    Smoker  Patient is currently smoking. He states that he is not smoking as much as he is used to because he has been on light duty, and he smokes more when he is working.    Asthma  Patient had asthma for many years managed by pulmonology. He states that he recently had testing done that indicated he does not have COPD asthma is exacerbated by allergies and he is currently doing okay. He denies chest pain, shortness of breath or dyspnea. He is not taking medication on regular basis even now he has it in his possession.         Current medicines (including changes today)  Current  Outpatient Prescriptions   Medication Sig Dispense Refill   • alprazolam (XANAX) 0.25 MG Tab Take 1 Tab by mouth 1 time daily as needed for Anxiety. 30 Tab 2   • fluticasone (FLONASE) 50 MCG/ACT nasal spray SPRAY 1 SPRAY IN EACH NOSTRIL EVERY DAY 3 Bottle 1   • omeprazole (PRILOSEC) 20 MG delayed-release capsule TAKE 1 CAPSULE BY MOUTH TWICE DAILY 180 Cap 1   • Cyclobenzaprine HCl (FLEXERIL PO) Take  by mouth.     • losartan-hydrochlorothiazide (HYZAAR) 50-12.5 MG per tablet Take 1 Tab by mouth every evening. 90 Tab 3   • albuterol 108 (90 BASE) MCG/ACT Aero Soln inhalation aerosol Inhale 2 Puffs by mouth every 6 hours as needed for Shortness of Breath. 8.5 g 3   • ipratropium-albuterol (DUONEB) 0.5-2.5 (3) MG/3ML nebulizer solution 3 mL by Nebulization route every 6 hours as needed for Shortness of Breath. 30 Vial 5   • ibuprofen (MOTRIN) 600 MG Tab Take 600 mg by mouth every 6 hours as needed for Mild Pain.       Current Facility-Administered Medications   Medication Dose Route Frequency Provider Last Rate Last Dose   • triamcinolone acetonide (KENALOG-40) injection 40 mg  40 mg Intramuscular Once GEORGE Ellis.           He  has a past medical history of Anxiety (9/7/2012); Arthritis; ASTHMA (9/7/2012); BMI 30.0-30.9,adult (3/17/2015); Chronic lower back pain (9/7/2012); Hypertension; Hypertension (9/7/2012); Influenza; Psychiatric disorder; and Tonsillitis.    ROS   Review of Systems   Constitutional: Negative for fever, chills, weight loss and malaise/fatigue.   HENT: Negative for ear pain, nosebleeds, congestion, sore throat and neck pain.  positive for runny nose, itching eyes  And sneezing.  Respiratory: Negative for cough, sputum production, shortness of breath and wheezing.    Cardiovascular: Negative for chest pain, palpitations,  and leg swelling.   Gastrointestinal: Negative for heartburn, nausea, vomiting, diarrhea and abdominal pain.   MS: Positive for  Chronic low back pain with  "sciatica  Neurological: Negative for dizziness, tingling, tremors, sensory change, focal weakness and headaches.   Psychiatric/Behavioral: Negative for depression, anxiety, suicidal ideas, insomnia and memory loss.   Skin: Positive for benign skin tags on both sides of the neck   All other systems reviewed and are negative except as in HPI.   Objective:   Physical Exam:  Blood pressure 126/88, pulse (!) 110, temperature 36.7 °C (98.1 °F), resp. rate 16, height 1.905 m (6' 3\"), weight 115.7 kg (255 lb), SpO2 96 %. Body mass index is 31.87 kg/m².   Physical Exam:  Alert, oriented in no acute distress.  Eye contact is good, speech goal directed, affect calm  HEENT: conjunctiva non-injected, sclera non-icteric.  Pinna normal.   Neck No adenopathy or masses in the neck or supraclavicular regions.  Lungs: clear to auscultation bilaterally with good excursion.  CV:Tachycardia rate and rhythm.   Abdomen: soft, nontender, No CVAT. Normal bowel sounds.  Ext: no edema, color normal, vascularity normal, temperature normal  MS: Normal gait and station    Assessment and Plan:   The following treatment plan was discussed   1. Anxiety  alprazolam (XANAX) 0.25 MG Tab    Continue current medication   2. Chronic bilateral low back pain with right-sided sciatica     3. Essential hypertension     4. Acute allergic rhinitis, unspecified seasonality, unspecified trigger  triamcinolone acetonide (KENALOG-40) injection 40 mg   5. Cutaneous skin tags  Consent for Amb Surgery/Procedure   6. Screening for cardiovascular, respiratory, and genitourinary diseases  COMP METABOLIC PANEL    LIPID PROFILE   7. Smoker     8. Mild persistent asthma without complication       S: The patient complains of symptomatic skin tags on the neck. These are irritated by clothing, jewelry and rubbing.  O: Patient appears well. Several benign skin tags are noted on the collar line both sides of the neck.   A: Skin tags   P: 4 Skin tags were cleansed using alcohol. " Local anesthesia was used: 2% lidocaine plain 1 cc total. Skin tags were removed using a dermatome and sterile iris scissors. These pathognomonic lesions are not sent for pathology. Discussed signs of  infection  Followup: Return in about 6 months (around 4/24/2018), or if symptoms worsen or fail to improve.   Please note that this dictation was created using voice recognition software. I have made every reasonable attempt to correct obvious errors, but I expect that there are errors of grammar and possibly content that I did not discover before finalizing the note.

## 2017-10-24 NOTE — ASSESSMENT & PLAN NOTE
Patient had asthma for many years managed by pulmonology. He states that he recently had testing done that indicated he does not have COPD asthma is exacerbated by allergies and he is currently doing okay. He denies chest pain, shortness of breath or dyspnea. He is not taking medication on regular basis even now he has it in his possession.

## 2017-11-07 ENCOUNTER — HOSPITAL ENCOUNTER (OUTPATIENT)
Dept: LAB | Facility: MEDICAL CENTER | Age: 57
End: 2017-11-07
Attending: NURSE PRACTITIONER
Payer: COMMERCIAL

## 2017-11-07 DIAGNOSIS — Z13.89 SCREENING FOR CARDIOVASCULAR, RESPIRATORY, AND GENITOURINARY DISEASES: ICD-10-CM

## 2017-11-07 DIAGNOSIS — Z13.6 SCREENING FOR CARDIOVASCULAR, RESPIRATORY, AND GENITOURINARY DISEASES: ICD-10-CM

## 2017-11-07 DIAGNOSIS — Z13.83 SCREENING FOR CARDIOVASCULAR, RESPIRATORY, AND GENITOURINARY DISEASES: ICD-10-CM

## 2017-11-07 LAB
ALBUMIN SERPL BCP-MCNC: 4.3 G/DL (ref 3.2–4.9)
ALBUMIN/GLOB SERPL: 1.5 G/DL
ALP SERPL-CCNC: 49 U/L (ref 30–99)
ALT SERPL-CCNC: 22 U/L (ref 2–50)
ANION GAP SERPL CALC-SCNC: 8 MMOL/L (ref 0–11.9)
AST SERPL-CCNC: 16 U/L (ref 12–45)
BILIRUB SERPL-MCNC: 1 MG/DL (ref 0.1–1.5)
BUN SERPL-MCNC: 16 MG/DL (ref 8–22)
CALCIUM SERPL-MCNC: 9.5 MG/DL (ref 8.5–10.5)
CHLORIDE SERPL-SCNC: 105 MMOL/L (ref 96–112)
CHOLEST SERPL-MCNC: 223 MG/DL (ref 100–199)
CO2 SERPL-SCNC: 23 MMOL/L (ref 20–33)
CREAT SERPL-MCNC: 0.86 MG/DL (ref 0.5–1.4)
GFR SERPL CREATININE-BSD FRML MDRD: >60 ML/MIN/1.73 M 2
GLOBULIN SER CALC-MCNC: 2.9 G/DL (ref 1.9–3.5)
GLUCOSE SERPL-MCNC: 104 MG/DL (ref 65–99)
HDLC SERPL-MCNC: 64 MG/DL
LDLC SERPL CALC-MCNC: 116 MG/DL
POTASSIUM SERPL-SCNC: 3.6 MMOL/L (ref 3.6–5.5)
PROT SERPL-MCNC: 7.2 G/DL (ref 6–8.2)
SODIUM SERPL-SCNC: 136 MMOL/L (ref 135–145)
TRIGL SERPL-MCNC: 217 MG/DL (ref 0–149)

## 2017-11-07 PROCEDURE — 80053 COMPREHEN METABOLIC PANEL: CPT

## 2017-11-07 PROCEDURE — 36415 COLL VENOUS BLD VENIPUNCTURE: CPT

## 2017-11-07 PROCEDURE — 80061 LIPID PANEL: CPT

## 2018-01-17 ENCOUNTER — OFFICE VISIT (OUTPATIENT)
Dept: URGENT CARE | Facility: PHYSICIAN GROUP | Age: 58
End: 2018-01-17
Payer: COMMERCIAL

## 2018-01-17 VITALS
OXYGEN SATURATION: 95 % | HEART RATE: 92 BPM | RESPIRATION RATE: 12 BRPM | TEMPERATURE: 98.6 F | DIASTOLIC BLOOD PRESSURE: 78 MMHG | SYSTOLIC BLOOD PRESSURE: 130 MMHG | WEIGHT: 254 LBS | HEIGHT: 74 IN | BODY MASS INDEX: 32.6 KG/M2

## 2018-01-17 DIAGNOSIS — G44.83 PRIMARY COUGH HEADACHE: ICD-10-CM

## 2018-01-17 DIAGNOSIS — J20.9 ACUTE BRONCHITIS, UNSPECIFIED ORGANISM: ICD-10-CM

## 2018-01-17 DIAGNOSIS — R10.10 PAIN OF UPPER ABDOMEN: ICD-10-CM

## 2018-01-17 PROCEDURE — 99214 OFFICE O/P EST MOD 30 MIN: CPT | Performed by: FAMILY MEDICINE

## 2018-01-17 RX ORDER — AZITHROMYCIN 250 MG/1
TABLET, FILM COATED ORAL
Qty: 6 TAB | Refills: 0 | Status: SHIPPED | OUTPATIENT
Start: 2018-01-17 | End: 2018-08-07

## 2018-01-17 RX ORDER — TRAMADOL HYDROCHLORIDE 50 MG/1
50 TABLET ORAL EVERY 8 HOURS PRN
Qty: 10 TAB | Refills: 0 | Status: SHIPPED | OUTPATIENT
Start: 2018-01-17 | End: 2018-01-21

## 2018-01-17 NOTE — PROGRESS NOTES
Subjective:      Alan Prabhakar Jr. is a 57 y.o. male who presents with Cough (Both wet and dry cough x2weeks); Abdominal Pain (Rt abdomen pain while coughing x2weeks); and Headache (Chronic headaches x2weeks )       Patient presents with multiple complaints. He's had 2 weeks of cough and chest congestion. He is in the process of quitting tobacco use he is just using cigars very infrequently currently has discontinued cigarettes. States that he's had some mild fevers and chills associated with this denies any significant shortness of breath states that he's been coughing so hard that he feels a pull in his right abdomen that has become very intense. He denies any inguinal hernia history doesn't a history of hiatal hernia.  He also states that he's had headaches along with this that has been very aggravating to him he's tried anti-inflammatories and Tylenol without significant relief in symptoms.    HPI  ROS  PMH:  has a past medical history of Anxiety (9/7/2012); Arthritis; ASTHMA (9/7/2012); BMI 30.0-30.9,adult (3/17/2015); Chronic lower back pain (9/7/2012); Hypertension; Hypertension (9/7/2012); Influenza; Psychiatric disorder; and Tonsillitis.  MEDS:   Current Outpatient Prescriptions:   •  tramadol (ULTRAM) 50 MG Tab, Take 1 Tab by mouth every 8 hours as needed for Moderate Pain for up to 4 days., Disp: 10 Tab, Rfl: 0  •  azithromycin (ZITHROMAX) 250 MG Tab, Take two tabs po day one followed by one tab po day two through five with food, Disp: 6 Tab, Rfl: 0  •  fluticasone (FLONASE) 50 MCG/ACT nasal spray, SPRAY 1 SPRAY IN EACH NOSTRIL EVERY DAY, Disp: 3 Bottle, Rfl: 1  •  omeprazole (PRILOSEC) 20 MG delayed-release capsule, TAKE 1 CAPSULE BY MOUTH TWICE DAILY, Disp: 180 Cap, Rfl: 1  •  losartan-hydrochlorothiazide (HYZAAR) 50-12.5 MG per tablet, Take 1 Tab by mouth every evening., Disp: 90 Tab, Rfl: 3  •  alprazolam (XANAX) 0.25 MG Tab, Take 1 Tab by mouth 1 time daily as needed for Anxiety., Disp: 30  "Tab, Rfl: 2  •  Cyclobenzaprine HCl (FLEXERIL PO), Take  by mouth., Disp: , Rfl:   •  albuterol 108 (90 BASE) MCG/ACT Aero Soln inhalation aerosol, Inhale 2 Puffs by mouth every 6 hours as needed for Shortness of Breath., Disp: 8.5 g, Rfl: 3  •  ipratropium-albuterol (DUONEB) 0.5-2.5 (3) MG/3ML nebulizer solution, 3 mL by Nebulization route every 6 hours as needed for Shortness of Breath., Disp: 30 Vial, Rfl: 5  •  ibuprofen (MOTRIN) 600 MG Tab, Take 600 mg by mouth every 6 hours as needed for Mild Pain., Disp: , Rfl:   ALLERGIES:   Allergies   Allergen Reactions   • Codeine Hives and Nausea   • Hydrocodone Itching   • Pcn [Penicillins] Unspecified     Pt states \"It's a childhood allergies\".    SURGHX:   Past Surgical History:   Procedure Laterality Date   • KNEE ARTHROSCOPY  6/19/2013    Performed by Jim Davila M.D. at SURGERY [facility] ORS   • MEDIAL MENISCECTOMY  6/19/2013    Performed by Jim Davila M.D. at [facility] ORS   • INJ,EPIDURAL/LUMB/SAC SINGLE  3/5/2012    Performed by GIAN ESCOBAR at SURGERY [facility] ORS   • TONSILLECTOMY  2004   • SHOULDER ARTHROSCOPY  2004    left   • KNEE ARTHROSCOPY  [id number]    left   SOCHX:  reports that he has been smoking Cigarettes and Cigars.  He started smoking about 40 years ago. He has a 74.00 pack-year smoking history. He has quit using smokeless tobacco. He reports that he drinks about 12.6 - 13.2 oz of alcohol per week . He reports that he does not use drugs.  FH: Family history was reviewed, no pertinent findings to report     Objective:     /78   Pulse 92   Temp 37 °C (98.6 °F)   Resp 12   Ht 1.88 m (6' 2\")   Wt 115.2 kg (254 lb)   SpO2 95%   BMI 32.61 kg/m²      Physical Exam   Constitutional: He is oriented to person, place, and time. He appears well-developed and well-nourished. No distress.   HENT:   Mild pharyngeal erythema is present   Eyes: Conjunctivae are normal.   Neck: Normal range of motion.   Cardiovascular: Normal " rate, regular rhythm, normal heart sounds and intact distal pulses.  Exam reveals no gallop and no friction rub.    No murmur heard.  Pulmonary/Chest: Effort normal. He has wheezes. He exhibits no tenderness.     Mild rhonchi as present   Abdominal: Soft. Bowel sounds are normal. He exhibits no distension and no mass. There is tenderness. There is no rebound and no guarding. No hernia.       Musculoskeletal: Normal range of motion. He exhibits no edema, tenderness or deformity.   Neurological: He is alert and oriented to person, place, and time. Coordination normal.   Skin: Skin is warm and dry. He is not diaphoretic.   Psychiatric: He has a normal mood and affect. His behavior is normal. Judgment and thought content normal.            Assessment/Plan:     1. Acute bronchitis, unspecified organism    - azithromycin (ZITHROMAX) 250 MG Tab; Take two tabs po day one followed by one tab po day two through five with food  Dispense: 6 Tab; Refill: 0    2. Pain of upper abdomen    - CT-ABDOMEN-PELVIS WITH; Future    3. Primary cough headache    - tramadol (ULTRAM) 50 MG Tab; Take 1 Tab by mouth every 8 hours as needed for Moderate Pain for up to 4 days.  Dispense: 10 Tab; Refill: 0  - CONSENT FOR OPIATE PRESCRIPTION    If patient's abdominal pain does not improve over the next several days after he is taking the antibiotic and over-the-counter cough medicines then he is instructed to get a CT abdomen and pelvis is also given a phone number where he can let me know that this is been done so I can look for the results    In prescribing controlled substances to this patient, I certify that I have obtained and reviewed the medical history of Alan Wolfasa Shafer. I have also made a good allen effort to obtain applicable records from other providers who have treated the patient and records did not demonstrate any increased risk of substance abuse that would prevent me from prescribing controlled substances.     I have  conducted a physical exam and documented it. I have reviewed Mr. Prabhakar’s prescription history as maintained by the [place] Prescription Monitoring Program.     I have assessed the patient’s risk for abuse, dependency, and addiction using the validated Opioid Risk Tool available at https://www.mdcalc.com/mqbong-epop-cnlf-ort-narcotic-abuse.     Given the above, I believe the benefits of controlled substance therapy outweigh the risks. The reasons for prescribing controlled substances include non-narcotic, oral analgesic alternatives have been inadequate for pain control. Accordingly, I have discussed the risk and benefits, treatment plan, and alternative therapies with the patient.

## 2018-03-29 ENCOUNTER — TELEPHONE (OUTPATIENT)
Dept: MEDICAL GROUP | Facility: PHYSICIAN GROUP | Age: 58
End: 2018-03-29

## 2018-03-29 NOTE — TELEPHONE ENCOUNTER
----- Message from Samina Reis M.D. sent at 3/28/2018  6:16 PM PDT -----  Regarding: RE: Patient due for annual LDCT lung cancer screening ([id number])  ABHIJIT WayneLi Ulloa is no longer with our medical group.  It appears this patient has not been reassigned within our med group.     Team - can we call Chalino and see if he has set up a new Primary and let him know he is due for annual CT screening for lung cancer.      ----- Message -----  From: Rosana Li R.N.  Sent: 3/21/2018   1:07 PM  To: MILI Ellis  Subject: Patient due for annual LDCT lung cancer scre#    Hi Ms. García,    Our records indicate that your patient is due for his annual LDCT lung cancer screening ([id number]).   Patients DO NOT need to repeat the Shared Decision Making (SDM) with our Lung Cancer Screening program APRN each year.        Your patient DOES need to be prescreened for eligibility annually.  I have sent you a reference guide by Email  for ordering previously - see page #2 for ordering annual screenings. Once ordered I can send a letter out to encourage the patient to schedule.     Let me know if you have any questions or how I might help.     Thanks!   Rosana Li, BSN, RN, [facility]   Lung Cancer Screening Program   [phone]     Eligibility for lung cancer screening program is based on best practice guidelines approved through the [facility]:  Age 55-77 yrs of age   30 pack year hx of smoking, or greater   Current smoker or if quit, must have quit within last 15 yrs   Asymptomatic (no signs or symptoms of lung cancer)    No previous history of lung cancer   No Chest CT within past 12 mos.

## 2018-03-29 NOTE — TELEPHONE ENCOUNTER
Patient was contacted. He stated that he was not interested in doing this right now.  I gave him the number and advised to call once he was ready.  Also asked the patient if he would like to set up a new patient appointment with a new provider.  Patient declined.

## 2018-04-16 DIAGNOSIS — I10 ESSENTIAL HYPERTENSION: Chronic | ICD-10-CM

## 2018-04-16 NOTE — TELEPHONE ENCOUNTER
Was the patient seen in the last year in this department? Yes     Does patient have an active prescription for medications requested? No     Received Request Via: Pharmacy    Pt met protocol?: Yes     Last OV 10/2017  BP Readings from Last 1 Encounters:   01/17/18 130/78

## 2018-04-17 RX ORDER — LOSARTAN POTASSIUM AND HYDROCHLOROTHIAZIDE 12.5; 5 MG/1; MG/1
1 TABLET ORAL EVERY EVENING
Qty: 90 TAB | Refills: 0 | Status: SHIPPED | OUTPATIENT
Start: 2018-04-17 | End: 2020-11-05

## 2018-04-17 NOTE — TELEPHONE ENCOUNTER
Last seen by PCP 10/17. Will send 3 months to pharmacy. Patient is due for an appointment with new provider. Please schedule.

## 2018-06-21 RX ORDER — OMEPRAZOLE 20 MG/1
CAPSULE, DELAYED RELEASE ORAL
Qty: 180 CAP | Refills: 0 | Status: ON HOLD | OUTPATIENT
Start: 2018-06-21 | End: 2021-11-02

## 2018-06-21 NOTE — TELEPHONE ENCOUNTER
Last seen by PCP 10/17. Will send 3 months to pharmacy.Patient is due for an appointment. Please schedule.

## 2018-07-06 ENCOUNTER — HOSPITAL ENCOUNTER (OUTPATIENT)
Dept: LAB | Facility: MEDICAL CENTER | Age: 58
End: 2018-07-06
Attending: FAMILY MEDICINE
Payer: COMMERCIAL

## 2018-07-06 LAB
ALBUMIN SERPL BCP-MCNC: 4.7 G/DL (ref 3.2–4.9)
ALBUMIN/GLOB SERPL: 1.7 G/DL
ALP SERPL-CCNC: 46 U/L (ref 30–99)
ALT SERPL-CCNC: 24 U/L (ref 2–50)
ANION GAP SERPL CALC-SCNC: 8 MMOL/L (ref 0–11.9)
APPEARANCE UR: CLEAR
AST SERPL-CCNC: 20 U/L (ref 12–45)
BACTERIA #/AREA URNS HPF: NEGATIVE /HPF
BASOPHILS # BLD AUTO: 0.6 % (ref 0–1.8)
BASOPHILS # BLD: 0.05 K/UL (ref 0–0.12)
BILIRUB SERPL-MCNC: 0.6 MG/DL (ref 0.1–1.5)
BILIRUB UR QL STRIP.AUTO: NEGATIVE
BUN SERPL-MCNC: 15 MG/DL (ref 8–22)
CALCIUM SERPL-MCNC: 9.2 MG/DL (ref 8.5–10.5)
CHLORIDE SERPL-SCNC: 102 MMOL/L (ref 96–112)
CHOLEST SERPL-MCNC: 201 MG/DL (ref 100–199)
CO2 SERPL-SCNC: 24 MMOL/L (ref 20–33)
COLOR UR: YELLOW
CREAT SERPL-MCNC: 0.76 MG/DL (ref 0.5–1.4)
EOSINOPHIL # BLD AUTO: 0.26 K/UL (ref 0–0.51)
EOSINOPHIL NFR BLD: 3 % (ref 0–6.9)
EPI CELLS #/AREA URNS HPF: NEGATIVE /HPF
ERYTHROCYTE [DISTWIDTH] IN BLOOD BY AUTOMATED COUNT: 49 FL (ref 35.9–50)
ERYTHROCYTE [SEDIMENTATION RATE] IN BLOOD BY WESTERGREN METHOD: 2 MM/HOUR (ref 0–20)
GLOBULIN SER CALC-MCNC: 2.8 G/DL (ref 1.9–3.5)
GLUCOSE SERPL-MCNC: 108 MG/DL (ref 65–99)
GLUCOSE UR STRIP.AUTO-MCNC: NEGATIVE MG/DL
HCT VFR BLD AUTO: 51.5 % (ref 42–52)
HDLC SERPL-MCNC: 71 MG/DL
HGB BLD-MCNC: 17.9 G/DL (ref 14–18)
HYALINE CASTS #/AREA URNS LPF: ABNORMAL /LPF
IMM GRANULOCYTES # BLD AUTO: 0.04 K/UL (ref 0–0.11)
IMM GRANULOCYTES NFR BLD AUTO: 0.5 % (ref 0–0.9)
KETONES UR STRIP.AUTO-MCNC: NEGATIVE MG/DL
LDLC SERPL CALC-MCNC: 94 MG/DL
LEUKOCYTE ESTERASE UR QL STRIP.AUTO: ABNORMAL
LYMPHOCYTES # BLD AUTO: 2.49 K/UL (ref 1–4.8)
LYMPHOCYTES NFR BLD: 28.9 % (ref 22–41)
MCH RBC QN AUTO: 31.2 PG (ref 27–33)
MCHC RBC AUTO-ENTMCNC: 34.8 G/DL (ref 33.7–35.3)
MCV RBC AUTO: 89.7 FL (ref 81.4–97.8)
MICRO URNS: ABNORMAL
MONOCYTES # BLD AUTO: 1.18 K/UL (ref 0–0.85)
MONOCYTES NFR BLD AUTO: 13.7 % (ref 0–13.4)
NEUTROPHILS # BLD AUTO: 4.6 K/UL (ref 1.82–7.42)
NEUTROPHILS NFR BLD: 53.3 % (ref 44–72)
NITRITE UR QL STRIP.AUTO: NEGATIVE
NRBC # BLD AUTO: 0 K/UL
NRBC BLD-RTO: 0 /100 WBC
PH UR STRIP.AUTO: 5.5 [PH]
PLATELET # BLD AUTO: 339 K/UL (ref 164–446)
PMV BLD AUTO: 10.4 FL (ref 9–12.9)
POTASSIUM SERPL-SCNC: 4.2 MMOL/L (ref 3.6–5.5)
PROT SERPL-MCNC: 7.5 G/DL (ref 6–8.2)
PROT UR QL STRIP: NEGATIVE MG/DL
PSA SERPL-MCNC: 0.96 NG/ML (ref 0–4)
RBC # BLD AUTO: 5.74 M/UL (ref 4.7–6.1)
RBC # URNS HPF: ABNORMAL /HPF
RBC UR QL AUTO: NEGATIVE
SODIUM SERPL-SCNC: 134 MMOL/L (ref 135–145)
SP GR UR STRIP.AUTO: 1.02
T3 SERPL-MCNC: 105.4 NG/DL (ref 60–181)
T4 SERPL-MCNC: 6.1 UG/DL (ref 4–12)
TESTOST SERPL-MCNC: 285 NG/DL (ref 175–781)
TRIGL SERPL-MCNC: 181 MG/DL (ref 0–149)
TSH SERPL DL<=0.005 MIU/L-ACNC: 1.74 UIU/ML (ref 0.38–5.33)
UROBILINOGEN UR STRIP.AUTO-MCNC: 1 MG/DL
WBC # BLD AUTO: 8.6 K/UL (ref 4.8–10.8)
WBC #/AREA URNS HPF: ABNORMAL /HPF

## 2018-07-06 PROCEDURE — 84443 ASSAY THYROID STIM HORMONE: CPT

## 2018-07-06 PROCEDURE — 85652 RBC SED RATE AUTOMATED: CPT

## 2018-07-06 PROCEDURE — 85025 COMPLETE CBC W/AUTO DIFF WBC: CPT

## 2018-07-06 PROCEDURE — 81001 URINALYSIS AUTO W/SCOPE: CPT

## 2018-07-06 PROCEDURE — 84153 ASSAY OF PSA TOTAL: CPT

## 2018-07-06 PROCEDURE — 84480 ASSAY TRIIODOTHYRONINE (T3): CPT

## 2018-07-06 PROCEDURE — 84436 ASSAY OF TOTAL THYROXINE: CPT

## 2018-07-06 PROCEDURE — 80053 COMPREHEN METABOLIC PANEL: CPT

## 2018-07-06 PROCEDURE — 36415 COLL VENOUS BLD VENIPUNCTURE: CPT

## 2018-07-06 PROCEDURE — 84403 ASSAY OF TOTAL TESTOSTERONE: CPT

## 2018-07-06 PROCEDURE — 80061 LIPID PANEL: CPT

## 2018-08-07 ENCOUNTER — HOSPITAL ENCOUNTER (OUTPATIENT)
Dept: RADIOLOGY | Facility: MEDICAL CENTER | Age: 58
End: 2018-08-07
Attending: PHYSICIAN ASSISTANT
Payer: COMMERCIAL

## 2018-08-07 ENCOUNTER — OFFICE VISIT (OUTPATIENT)
Dept: URGENT CARE | Facility: PHYSICIAN GROUP | Age: 58
End: 2018-08-07
Payer: COMMERCIAL

## 2018-08-07 ENCOUNTER — HOSPITAL ENCOUNTER (OUTPATIENT)
Facility: MEDICAL CENTER | Age: 58
End: 2018-08-07
Attending: PHYSICIAN ASSISTANT
Payer: COMMERCIAL

## 2018-08-07 VITALS
SYSTOLIC BLOOD PRESSURE: 134 MMHG | OXYGEN SATURATION: 96 % | TEMPERATURE: 98.1 F | HEART RATE: 95 BPM | DIASTOLIC BLOOD PRESSURE: 82 MMHG | RESPIRATION RATE: 16 BRPM | WEIGHT: 248.8 LBS | BODY MASS INDEX: 31.94 KG/M2

## 2018-08-07 DIAGNOSIS — M19.90 JOINT INFLAMMATION: ICD-10-CM

## 2018-08-07 DIAGNOSIS — M79.641 HAND PAIN, RIGHT: ICD-10-CM

## 2018-08-07 DIAGNOSIS — S63.641A SPRAIN OF METACARPOPHALANGEAL (MCP) JOINT OF RIGHT THUMB, INITIAL ENCOUNTER: ICD-10-CM

## 2018-08-07 LAB
ERYTHROCYTE [SEDIMENTATION RATE] IN BLOOD BY WESTERGREN METHOD: 1 MM/HOUR (ref 0–20)
URATE SERPL-MCNC: 5.8 MG/DL (ref 2.5–8.3)

## 2018-08-07 PROCEDURE — 99214 OFFICE O/P EST MOD 30 MIN: CPT | Performed by: PHYSICIAN ASSISTANT

## 2018-08-07 PROCEDURE — 85652 RBC SED RATE AUTOMATED: CPT

## 2018-08-07 PROCEDURE — 73130 X-RAY EXAM OF HAND: CPT | Mod: RT

## 2018-08-07 PROCEDURE — 84550 ASSAY OF BLOOD/URIC ACID: CPT

## 2018-08-07 RX ORDER — METHYLPREDNISOLONE 4 MG/1
TABLET ORAL
Qty: 21 TAB | Refills: 0 | Status: SHIPPED | OUTPATIENT
Start: 2018-08-07 | End: 2020-11-05

## 2018-08-07 ASSESSMENT — ENCOUNTER SYMPTOMS
CARDIOVASCULAR NEGATIVE: 1
RESPIRATORY NEGATIVE: 1
CONSTITUTIONAL NEGATIVE: 1

## 2018-08-07 NOTE — LETTER
August 7, 2018         Patient: Alan Prabhakar Jr.   YOB: 1960   Date of Visit: 8/7/2018           To Whom it May Concern:    Alan Prabhakar was seen in my clinic on 8/7/2018.  Please excuse him from work through Thursday to recover.     If you have any questions or concerns, please don't hesitate to call.        Sincerely,           Kailee Larsen P.A.-C.  Electronically Signed

## 2018-08-07 NOTE — PROGRESS NOTES
Subjective:      Alan Prabhakar Jr. is a 57 y.o. male who presents with Hand Pain (left hand pain radiates down to almost elbow, x 2 months, felt a pop 2 days ago)        HPI   Patient presents for about 2 months of waxing and waning right hand pain, around the base of the 3rd and 4th knuckles in particular.  Patient also states a few days ago he was moving his ATV and felt a pinch and pop near base of the thumb and now that area is also hurting and aching in a similar way to the other knuckles.  He notes some pain radiating from his thumb into the wrist area and sometimes some ache all the way to his elbow.    Patient notes that there is a lot of tingling intermittently along with the pain. He notes that making a fist is painful as well as almost any other movement, in particular opening a jar or water bottle.   He has taken Ibuprofen without relief however this does tend to help his back pain.  No FH of arthritis/autoimmune he is aware of.  No hx of gout.     Review of Systems   Constitutional: Negative.    HENT: Negative.    Respiratory: Negative.    Cardiovascular: Negative.    Musculoskeletal:        SEE HPI   Skin: Negative.    Endo/Heme/Allergies: Negative.    All other systems reviewed and are negative.       PMH:  has a past medical history of Anxiety (9/7/2012); Arthritis; ASTHMA (9/7/2012); BMI 30.0-30.9,adult (3/17/2015); Chronic lower back pain (9/7/2012); Hypertension; Hypertension (9/7/2012); Influenza; Psychiatric disorder; and Tonsillitis.  MEDS:   Current Outpatient Prescriptions:   •  omeprazole (PRILOSEC) 20 MG delayed-release capsule, TAKE 1 CAPSULE BY MOUTH TWICE DAILY, Disp: 180 Cap, Rfl: 0  •  losartan-hydrochlorothiazide (HYZAAR) 50-12.5 MG per tablet, Take 1 Tab by mouth every evening., Disp: 90 Tab, Rfl: 0  •  fluticasone (FLONASE) 50 MCG/ACT nasal spray, SPRAY 1 SPRAY IN EACH NOSTRIL EVERY DAY, Disp: 3 Bottle, Rfl: 1  •  alprazolam (XANAX) 0.25 MG Tab, Take 1 Tab by mouth 1 time  "daily as needed for Anxiety., Disp: 30 Tab, Rfl: 2  •  ibuprofen (MOTRIN) 600 MG Tab, Take 600 mg by mouth every 6 hours as needed for Mild Pain., Disp: , Rfl:   ALLERGIES:   Allergies   Allergen Reactions   • Codeine Hives and Nausea   • Hydrocodone Itching   • Pcn [Penicillins] Unspecified     Pt states \"It's a childhood allergies\".      SURGHX:   Past Surgical History:   Procedure Laterality Date   • KNEE ARTHROSCOPY  6/19/2013    Performed by Jim Davila M.D. at SUR[facility]ERY [facility]   • MEDIAL MENISCECTOMY  6/19/2013    Performed by Jim Davila M.D. at SURGERY [facility]   • INJ,EPIDURAL/LUMB/SAC SINGLE  3/5/2012    Performed by GIAN ESCOBAR at SURGERY [facility]S   • TONSILLECTOMY  2004   • SHOULDER ARTHROSCOPY  2004    left   • KNEE ARTHROSCOPY  [id number]    left     SOCHX:  reports that he has been smoking Cigarettes and Cigars.  He started smoking about 40 years ago. He has a 74.00 pack-year smoking history. He has quit using smokeless tobacco. He reports that he drinks about 12.6 - 13.2 oz of alcohol per week . He reports that he does not use drugs.  FH: Family history was reviewed, no pertinent findings to report   Objective:     /82   Pulse 95   Temp 36.7 °C (98.1 °F)   Resp 16   Wt 112.9 kg (248 lb 12.8 oz)   SpO2 96%   BMI 31.94 kg/m²      Physical Exam   Constitutional: He appears well-developed and well-nourished. No distress.   HENT:   Head: Normocephalic and atraumatic.   Eyes: Conjunctivae and EOM are normal.   Neck: Normal range of motion. Neck supple.   Cardiovascular: Normal rate and regular rhythm.    Pulmonary/Chest: Effort normal and breath sounds normal.   Musculoskeletal:        Hands:  Skin: Skin is warm and dry.   Psychiatric: He has a normal mood and affect. His behavior is normal.   Vitals reviewed.             RAD    There is mild joint space narrowing of the 3rd MCP joint with small subchondral cyst and possible spur or intra-articular body along the " "ulnar aspect of the joint. The remainder of the MCP joints are within normal limits. The metacarpals are   unremarkable.   Impression       1.  There is mild degenerative type change involving the right 3rd MCP joint with a possible small intra-articular body versus osteophyte.   Reading Provider Reading Date   Melina Aguirre M.D. Aug 7, 2018       Assessment/Plan:     1. Hand pain, right  DX-HAND 3+ RIGHT   2. Joint inflammation  WESTERGREN SED RATE    URIC ACID, SERUM   3. Sprain of metacarpophalangeal (MCP) joint of right thumb, initial encounter         -RAD as above.   -due to atraumatic MCP joint pain/inflammation will screen with SED rate, added uric acid however no hx of gout.   -patient placed in thumb spica for thumb sprain, continue ice/rest  -work note provided.   -trial of Medrol dose pack for acute inflammation and failure of 800 mg Ibuprofen dosages.   -recommend PCP follow up      Supportive care, differential diagnoses, and indications for immediate follow-up discussed with patient.   Pathogenesis of diagnosis discussed including typical length and natural progression.   Instructed to return to clinic or nearest emergency department for any change in condition, further concerns, or worsening of symptoms.  Patient states understanding of the plan of care and discharge instructions.      BRITTNEY King-C.        08/08/18 - 1200.  KAYY Fuentes  for [employer] reached out due to note provided for patient regarding having the next two days off to rest and recover.   Per Kimmy patient states that he notes he felt it was work related but \"did not want to file claim\"  Patient was asked if this was worked related at time of visit and was stated that it was not only that work can be aggravating of pain and that current pain episode was exacerbated by thumb injury caused by lifting ATV over the weekend.   Patient regardless will need to be seen again for full duty release per company. "     Kailee Larsen P.A.-C.

## 2018-10-05 ENCOUNTER — HOSPITAL ENCOUNTER (OUTPATIENT)
Dept: LAB | Facility: MEDICAL CENTER | Age: 58
End: 2018-10-05
Attending: FAMILY MEDICINE
Payer: COMMERCIAL

## 2018-10-05 LAB
ALBUMIN SERPL BCP-MCNC: 4.5 G/DL (ref 3.2–4.9)
ALBUMIN/GLOB SERPL: 1.5 G/DL
ALP SERPL-CCNC: 45 U/L (ref 30–99)
ALT SERPL-CCNC: 24 U/L (ref 2–50)
ANION GAP SERPL CALC-SCNC: 11 MMOL/L (ref 0–11.9)
AST SERPL-CCNC: 18 U/L (ref 12–45)
BILIRUB SERPL-MCNC: 0.6 MG/DL (ref 0.1–1.5)
BUN SERPL-MCNC: 16 MG/DL (ref 8–22)
CALCIUM SERPL-MCNC: 9.8 MG/DL (ref 8.5–10.5)
CHLORIDE SERPL-SCNC: 104 MMOL/L (ref 96–112)
CO2 SERPL-SCNC: 25 MMOL/L (ref 20–33)
CREAT SERPL-MCNC: 0.83 MG/DL (ref 0.5–1.4)
ERYTHROCYTE [SEDIMENTATION RATE] IN BLOOD BY WESTERGREN METHOD: 0 MM/HOUR (ref 0–20)
FASTING STATUS PATIENT QL REPORTED: NORMAL
GLOBULIN SER CALC-MCNC: 3.1 G/DL (ref 1.9–3.5)
GLUCOSE SERPL-MCNC: 99 MG/DL (ref 65–99)
POTASSIUM SERPL-SCNC: 3.8 MMOL/L (ref 3.6–5.5)
PROT SERPL-MCNC: 7.6 G/DL (ref 6–8.2)
RHEUMATOID FACT SER IA-ACNC: <10 IU/ML (ref 0–14)
SODIUM SERPL-SCNC: 140 MMOL/L (ref 135–145)
URATE SERPL-MCNC: 6.9 MG/DL (ref 2.5–8.3)

## 2018-10-05 PROCEDURE — 84550 ASSAY OF BLOOD/URIC ACID: CPT

## 2018-10-05 PROCEDURE — 86038 ANTINUCLEAR ANTIBODIES: CPT

## 2018-10-05 PROCEDURE — 86431 RHEUMATOID FACTOR QUANT: CPT

## 2018-10-05 PROCEDURE — 36415 COLL VENOUS BLD VENIPUNCTURE: CPT

## 2018-10-05 PROCEDURE — 85652 RBC SED RATE AUTOMATED: CPT

## 2018-10-05 PROCEDURE — 80053 COMPREHEN METABOLIC PANEL: CPT

## 2018-10-08 LAB — NUCLEAR IGG SER QL IA: NORMAL

## 2018-11-13 ENCOUNTER — PATIENT OUTREACH (OUTPATIENT)
Dept: OTHER | Facility: MEDICAL CENTER | Age: 58
End: 2018-11-13

## 2018-12-12 ENCOUNTER — OFFICE VISIT (OUTPATIENT)
Dept: URGENT CARE | Facility: PHYSICIAN GROUP | Age: 58
End: 2018-12-12
Payer: COMMERCIAL

## 2018-12-12 VITALS
SYSTOLIC BLOOD PRESSURE: 144 MMHG | RESPIRATION RATE: 16 BRPM | OXYGEN SATURATION: 95 % | BODY MASS INDEX: 32.73 KG/M2 | HEIGHT: 74 IN | HEART RATE: 100 BPM | TEMPERATURE: 97.8 F | DIASTOLIC BLOOD PRESSURE: 90 MMHG | WEIGHT: 255 LBS

## 2018-12-12 DIAGNOSIS — R68.89 FLU-LIKE SYMPTOMS: ICD-10-CM

## 2018-12-12 LAB
FLUAV+FLUBV AG SPEC QL IA: NEGATIVE
INT CON NEG: NEGATIVE
INT CON POS: POSITIVE

## 2018-12-12 PROCEDURE — 99213 OFFICE O/P EST LOW 20 MIN: CPT | Performed by: EMERGENCY MEDICINE

## 2018-12-12 PROCEDURE — 87804 INFLUENZA ASSAY W/OPTIC: CPT | Performed by: EMERGENCY MEDICINE

## 2018-12-12 RX ORDER — OSELTAMIVIR PHOSPHATE 75 MG/1
75 CAPSULE ORAL 2 TIMES DAILY
Qty: 10 CAP | Refills: 0 | Status: SHIPPED | OUTPATIENT
Start: 2018-12-12 | End: 2020-11-05

## 2018-12-12 ASSESSMENT — ENCOUNTER SYMPTOMS
NAUSEA: 0
HEMOPTYSIS: 0
NERVOUS/ANXIOUS: 0
MYALGIAS: 1
NECK PAIN: 0
SENSORY CHANGE: 0
VOMITING: 0
COUGH: 1
EYE DISCHARGE: 0
SPUTUM PRODUCTION: 0
SINUS PAIN: 0
HEADACHES: 1
SPEECH CHANGE: 0
DIARRHEA: 0
CHILLS: 0
FEVER: 1

## 2018-12-12 NOTE — LETTER
December 12, 2018        Alan Prabhakar Jr.  [address] [facility] [address] [address]        Dear Alan:    Please ask for the next 2 days off from work for medical reasons.    If you have any questions or concerns, please don't hesitate to call.        Sincerely,        Solomon Elizondo M.D.    Electronically Signed

## 2018-12-12 NOTE — PROGRESS NOTES
"Subjective:      Alan Prabhakar Jr. is a 58 y.o. male who presents with Cough (Dry cough, fever, diarrhea, congestion, headaches x2days )            HPI  Patient is a 58-year-old male with 74-year pack history of smoking complaining of possible exposure to flu complaining of a dry cough fever for the past 2 days.  Patient's symptoms have been present for the past 36 hours he has a daughter who is positive for flu at home.  PMH:  has a past medical history of Anxiety (9/7/2012); Arthritis; ASTHMA (9/7/2012); BMI 30.0-30.9,adult (3/17/2015); Chronic lower back pain (9/7/2012); Hypertension; Hypertension (9/7/2012); Influenza; Psychiatric disorder; and Tonsillitis.  MEDS:   Current Outpatient Prescriptions:   •  omeprazole (PRILOSEC) 20 MG delayed-release capsule, TAKE 1 CAPSULE BY MOUTH TWICE DAILY, Disp: 180 Cap, Rfl: 0  •  losartan-hydrochlorothiazide (HYZAAR) 50-12.5 MG per tablet, Take 1 Tab by mouth every evening., Disp: 90 Tab, Rfl: 0  •  alprazolam (XANAX) 0.25 MG Tab, Take 1 Tab by mouth 1 time daily as needed for Anxiety., Disp: 30 Tab, Rfl: 2  •  fluticasone (FLONASE) 50 MCG/ACT nasal spray, SPRAY 1 SPRAY IN EACH NOSTRIL EVERY DAY, Disp: 3 Bottle, Rfl: 1  •  ibuprofen (MOTRIN) 600 MG Tab, Take 600 mg by mouth every 6 hours as needed for Mild Pain., Disp: , Rfl:   •  MethylPREDNISolone (MEDROL DOSEPAK) 4 MG Tablet Therapy Pack, Take as directed (Patient not taking: Reported on 12/12/2018), Disp: 21 Tab, Rfl: 0  ALLERGIES:   Allergies   Allergen Reactions   • Codeine Hives and Nausea   • Hydrocodone Itching   • Pcn [Penicillins] Unspecified     Pt states \"It's a childhood allergies\".      SURGHX:   Past Surgical History:   Procedure Laterality Date   • KNEE ARTHROSCOPY  6/19/2013    Performed by Jim Davila M.D. at [facility] ORS   • MEDIAL MENISCECTOMY  6/19/2013    Performed by Jim Davila M.D. at [facility] ORS   • INJ,EPIDURAL/LUMB/SAC SINGLE  3/5/2012    Performed by LUZ, " "GIAN BAJWA at [facility]   • TONSILLECTOMY  2004   • SHOULDER ARTHROSCOPY  2004    left   • KNEE ARTHROSCOPY  [id number]    left     SOCHX:  reports that he has been smoking Cigarettes and Cigars.  He started smoking about 41 years ago. He has a 74.00 pack-year smoking history. He has quit using smokeless tobacco. He reports that he drinks about 12.6 - 13.2 oz of alcohol per week . He reports that he does not use drugs.  FH: Reviewed with patient, not pertinent to this visit.   Review of Systems   Constitutional: Positive for fever. Negative for chills.   HENT: Negative for sinus pain.    Eyes: Negative for discharge.   Respiratory: Positive for cough. Negative for hemoptysis and sputum production.    Cardiovascular: Negative for chest pain.   Gastrointestinal: Negative for diarrhea, nausea and vomiting.   Musculoskeletal: Positive for myalgias. Negative for neck pain.   Skin: Negative for rash.   Neurological: Positive for headaches. Negative for sensory change and speech change.   Psychiatric/Behavioral: The patient is not nervous/anxious.           Objective:     /90   Pulse 100   Temp 36.6 °C (97.8 °F) (Temporal)   Resp 16   Ht 1.88 m (6' 2\")   Wt 115.7 kg (255 lb)   SpO2 95%   BMI 32.74 kg/m²      Physical Exam   Constitutional: He appears well-developed and well-nourished. No distress.   HENT:   Head: Normocephalic and atraumatic.   Right Ear: External ear normal.   Eyes: Conjunctivae are normal. Left eye exhibits no discharge.   Cardiovascular: Regular rhythm.    Pulmonary/Chest: Effort normal and breath sounds normal. No respiratory distress. He has no wheezes.   Abdominal: He exhibits no distension. There is tenderness.   Musculoskeletal: Normal range of motion.   Skin: Skin is warm. No erythema.   Psychiatric: He has a normal mood and affect. His behavior is normal.   Nursing note and vitals reviewed.              Assessment/Plan:     Diagnosis: Flu exposure                      " Tobacco abuse          Patient is in the window of less than 48 hours of flulike symptoms so will be treated with Tamiflu  I am recommending the patient initiate/ continue hydration efforts including the use of a vaporizer/humidifier/ netti pot. I also recommend the pt, initiate Mucinex.. In addition the patient will initiate the prescribed prescription medication/s: Tamiflu 75 mg twice a day for 5 days.. If the patient's condition exacerbates with worsening dysphagia, shortness of breath, uncontrolled fever, headache or chest pressure he/she will return immediately to the urgent care or go to  the emergency department for further evaluation.    Solomon Elizondo

## 2018-12-20 RX ORDER — OMEPRAZOLE 20 MG/1
CAPSULE, DELAYED RELEASE ORAL
Refills: 0 | OUTPATIENT
Start: 2018-12-20

## 2018-12-20 NOTE — TELEPHONE ENCOUNTER
Was the patient seen in the last year in this department? No     Does patient have an active prescription for medications requested? No     Received Request Via: Pharmacy    Pt met protocol?: No     Last OV 10/2017

## 2019-03-26 ENCOUNTER — OFFICE VISIT (OUTPATIENT)
Dept: URGENT CARE | Facility: PHYSICIAN GROUP | Age: 59
End: 2019-03-26
Payer: COMMERCIAL

## 2019-03-26 VITALS
SYSTOLIC BLOOD PRESSURE: 108 MMHG | OXYGEN SATURATION: 96 % | HEART RATE: 105 BPM | BODY MASS INDEX: 31.06 KG/M2 | RESPIRATION RATE: 16 BRPM | HEIGHT: 74 IN | TEMPERATURE: 98.7 F | WEIGHT: 242 LBS | DIASTOLIC BLOOD PRESSURE: 68 MMHG

## 2019-03-26 DIAGNOSIS — A08.4 VIRAL GASTROENTERITIS: ICD-10-CM

## 2019-03-26 DIAGNOSIS — R05.9 COUGH: ICD-10-CM

## 2019-03-26 PROCEDURE — 99214 OFFICE O/P EST MOD 30 MIN: CPT | Performed by: PHYSICIAN ASSISTANT

## 2019-03-26 RX ORDER — ONDANSETRON HYDROCHLORIDE 8 MG/1
8 TABLET, FILM COATED ORAL EVERY 8 HOURS PRN
Qty: 15 TAB | Refills: 0 | Status: SHIPPED | OUTPATIENT
Start: 2019-03-26 | End: 2020-11-05

## 2019-03-26 RX ORDER — ONDANSETRON 4 MG/1
4 TABLET, ORALLY DISINTEGRATING ORAL ONCE
Status: COMPLETED | OUTPATIENT
Start: 2019-03-26 | End: 2019-03-26

## 2019-03-26 RX ORDER — BENZONATATE 200 MG/1
200 CAPSULE ORAL 3 TIMES DAILY PRN
Qty: 60 CAP | Refills: 0 | Status: SHIPPED | OUTPATIENT
Start: 2019-03-26 | End: 2020-11-05

## 2019-03-26 RX ADMIN — ONDANSETRON 4 MG: 4 TABLET, ORALLY DISINTEGRATING ORAL at 14:06

## 2019-03-26 NOTE — LETTER
March 26, 2019         Patient: Alan Prabhakar Jr.   YOB: 1960   Date of Visit: 3/26/2019           To Whom it May Concern:    Alan Prabhakar was seen in my clinic on 3/26/2019. He may return to work on 3/29/2019 or sooner if condition improves sooner.       If you have any questions or concerns, please don't hesitate to call.        Sincerely,           Rula Cantu P.A.-C.  Electronically Signed

## 2019-03-26 NOTE — PROGRESS NOTES
"Subjective:      Alan Prabhakar Jr. is a 58 y.o. male who presents with Emesis (vomiting, headache, chills, feverish x 4days)    PMH:  has a past medical history of Anxiety (9/7/2012); Arthritis; ASTHMA (9/7/2012); BMI 30.0-30.9,adult (3/17/2015); Chronic lower back pain (9/7/2012); Hypertension; Hypertension (9/7/2012); Influenza; Psychiatric disorder; and Tonsillitis.  MEDS:   Current Outpatient Prescriptions:   •  omeprazole (PRILOSEC) 20 MG delayed-release capsule, TAKE 1 CAPSULE BY MOUTH TWICE DAILY, Disp: 180 Cap, Rfl: 0  •  losartan-hydrochlorothiazide (HYZAAR) 50-12.5 MG per tablet, Take 1 Tab by mouth every evening., Disp: 90 Tab, Rfl: 0  •  fluticasone (FLONASE) 50 MCG/ACT nasal spray, SPRAY 1 SPRAY IN EACH NOSTRIL EVERY DAY, Disp: 3 Bottle, Rfl: 1  •  oseltamivir (TAMIFLU) 75 MG Cap, Take 1 Cap by mouth 2 times a day. (Patient not taking: Reported on 3/26/2019), Disp: 10 Cap, Rfl: 0  •  MethylPREDNISolone (MEDROL DOSEPAK) 4 MG Tablet Therapy Pack, Take as directed (Patient not taking: Reported on 12/12/2018), Disp: 21 Tab, Rfl: 0  •  alprazolam (XANAX) 0.25 MG Tab, Take 1 Tab by mouth 1 time daily as needed for Anxiety., Disp: 30 Tab, Rfl: 2  •  ibuprofen (MOTRIN) 600 MG Tab, Take 600 mg by mouth every 6 hours as needed for Mild Pain., Disp: , Rfl:   ALLERGIES:   Allergies   Allergen Reactions   • Codeine Hives and Nausea   • Hydrocodone Itching   • Pcn [Penicillins] Unspecified     Pt states \"It's a childhood allergies\".      SURGHX:   Past Surgical History:   Procedure Laterality Date   • KNEE ARTHROSCOPY  6/19/2013    Performed by Jim Davila M.D. at [facility] ORS   • MEDIAL MENISCECTOMY  6/19/2013    Performed by Jim Davila M.D. at [facility] ORS   • INJ,EPIDURAL/LUMB/SAC SINGLE  3/5/2012    Performed by GIAN ESCOBAR at [facility]   • TONSILLECTOMY  2004   • SHOULDER ARTHROSCOPY  2004    left   • KNEE ARTHROSCOPY  [id number]    left     SOCHX:  " "reports that he has been smoking Cigarettes and Cigars.  He started smoking about 41 years ago. He has a 74.00 pack-year smoking history. He has quit using smokeless tobacco. He reports that he drinks about 12.6 - 13.2 oz of alcohol per week . He reports that he does not use drugs.  FH: Reviewed with patient, not pertinent to this visit.           Patient presents with:  Emesis: vomiting, headache, chills, feverish x 4days. Needs a work note.           Gastroenteritis    This is a new problem. The current episode started in the past 7 days. The problem occurs 2 to 4 times per day. The problem has been waxing and waning. The emesis has an appearance of stomach contents. Maximum temperature: unmeasured. The fever has been present for 1 to 2 days. Associated symptoms include chills, coughing, diarrhea, headaches and sweats. Pertinent negatives include no abdominal pain. Risk factors include ill contacts. He has tried diet change and increased fluids for the symptoms. The treatment provided mild relief.       Review of Systems   Constitutional: Positive for chills.   Respiratory: Positive for cough.    Gastrointestinal: Positive for diarrhea, nausea and vomiting. Negative for abdominal pain, blood in stool, constipation and melena.   Neurological: Positive for headaches.   All other systems reviewed and are negative.         Objective:     /68 (BP Location: Left arm, Patient Position: Sitting, BP Cuff Size: Adult)   Pulse (!) 105   Temp 37.1 °C (98.7 °F) (Temporal)   Resp 16   Ht 1.88 m (6' 2\")   Wt 109.8 kg (242 lb)   SpO2 96%   BMI 31.07 kg/m²      Physical Exam   Constitutional: He is oriented to person, place, and time. He appears well-developed and well-nourished. No distress.   HENT:   Head: Normocephalic and atraumatic.   Nose: Nose normal.   Mouth/Throat: Oropharynx is clear and moist.   Eyes: Pupils are equal, round, and reactive to light. Conjunctivae and EOM are normal.   Neck: Normal range of " motion.   Cardiovascular: Regular rhythm and normal heart sounds.    Pulmonary/Chest: Effort normal and breath sounds normal.   Abdominal: Soft.   Musculoskeletal: Normal range of motion.   Neurological: He is alert and oriented to person, place, and time. Gait normal.   Skin: Skin is warm and dry. Capillary refill takes less than 2 seconds.   Psychiatric: He has a normal mood and affect.   Nursing note and vitals reviewed.           Assessment/Plan:     1. Viral gastroenteritis  ondansetron (ZOFRAN) 8 MG Tab    benzonatate (TESSALON) 200 MG capsule    ondansetron (ZOFRAN ODT) dispertab 4 mg   2. Cough  benzonatate (TESSALON) 200 MG capsule    ondansetron (ZOFRAN ODT) dispertab 4 mg     PT to follow clear diet for 8-12 hours, then progress to bland diet for 24 hours, then progress to regular diet as tolerated.     PT should follow up with PCP in 1-2 days for re-evaluation if symptoms have not improved.  Discussed red flags and reasons to return to UC or ED.  Pt and/or family verbalized understanding of diagnosis and follow up instructions and was offered informational handout on diagnosis.  PT discharged.

## 2019-03-30 ENCOUNTER — OFFICE VISIT (OUTPATIENT)
Dept: URGENT CARE | Facility: PHYSICIAN GROUP | Age: 59
End: 2019-03-30
Payer: COMMERCIAL

## 2019-03-30 VITALS
DIASTOLIC BLOOD PRESSURE: 78 MMHG | SYSTOLIC BLOOD PRESSURE: 128 MMHG | OXYGEN SATURATION: 95 % | BODY MASS INDEX: 30.09 KG/M2 | HEIGHT: 75 IN | TEMPERATURE: 98.6 F | HEART RATE: 94 BPM | RESPIRATION RATE: 18 BRPM | WEIGHT: 242 LBS

## 2019-03-30 DIAGNOSIS — J01.90 ACUTE RHINOSINUSITIS: ICD-10-CM

## 2019-03-30 DIAGNOSIS — J30.9 ALLERGIC RHINITIS, UNSPECIFIED SEASONALITY, UNSPECIFIED TRIGGER: ICD-10-CM

## 2019-03-30 DIAGNOSIS — J45.901 ACUTE EXACERBATION OF COPD WITH ASTHMA (HCC): ICD-10-CM

## 2019-03-30 DIAGNOSIS — J44.1 ACUTE EXACERBATION OF COPD WITH ASTHMA (HCC): ICD-10-CM

## 2019-03-30 PROCEDURE — 99214 OFFICE O/P EST MOD 30 MIN: CPT | Performed by: EMERGENCY MEDICINE

## 2019-03-30 RX ORDER — INHALER, ASSIST DEVICES
1 SPACER (EA) MISCELLANEOUS ONCE
Qty: 1 EACH | Refills: 0 | Status: SHIPPED | OUTPATIENT
Start: 2019-03-30 | End: 2019-03-30

## 2019-03-30 RX ORDER — FLUTICASONE PROPIONATE 220 UG/1
1 AEROSOL, METERED RESPIRATORY (INHALATION) 2 TIMES DAILY
Qty: 1 INHALER | Refills: 0 | Status: SHIPPED | OUTPATIENT
Start: 2019-03-30 | End: 2020-11-05

## 2019-03-30 RX ORDER — DOXYCYCLINE HYCLATE 100 MG
100 TABLET ORAL 2 TIMES DAILY
Qty: 14 TAB | Refills: 0 | Status: SHIPPED | OUTPATIENT
Start: 2019-03-30 | End: 2019-10-28

## 2019-03-30 RX ORDER — PREDNISONE 20 MG/1
40 TABLET ORAL DAILY
Qty: 10 TAB | Refills: 0 | Status: SHIPPED | OUTPATIENT
Start: 2019-03-30 | End: 2020-11-05

## 2019-03-30 ASSESSMENT — ENCOUNTER SYMPTOMS
FEVER: 0
SHORTNESS OF BREATH: 0
COUGH: 1
SINUS PAIN: 1
SPUTUM PRODUCTION: 1
SORE THROAT: 0
WHEEZING: 1
HEMOPTYSIS: 0
HEARTBURN: 0
RHINORRHEA: 1

## 2019-03-30 ASSESSMENT — COPD QUESTIONNAIRES: COPD: 1

## 2019-03-30 NOTE — PATIENT INSTRUCTIONS
Bronchospasm, Adult  A bronchospasm is when the tubes that carry air in and out of your lungs (airways) spasm or tighten. During a bronchospasm it is hard to breathe. This is because the airways get smaller. A bronchospasm can be triggered by:  · Allergies. These may be to animals, pollen, food, or mold.  · Infection. This is a common cause of bronchospasm.  · Exercise.  · Irritants. These include pollution, cigarette smoke, strong odors, aerosol sprays, and paint fumes.  · Weather changes.  · Stress.  · Being emotional.  Follow these instructions at home:  · Always have a plan for getting help. Know when to call your doctor and local emergency services (911 in the [place] Know where you can get emergency care.  · Only take medicines as told by your doctor.  · If you were prescribed an inhaler or nebulizer machine, ask your doctor how to use it correctly. Always use a spacer with your inhaler if you were given one.  · Stay calm during an attack. Try to relax and breathe more slowly.  · Control your home environment:  ¨ Change your heating and air conditioning filter at least once a month.  ¨ Limit your use of fireplaces and wood stoves.  ¨ Do not  smoke. Do not  allow smoking in your home.  ¨ Avoid perfumes and fragrances.  ¨ Get rid of pests (such as roaches and mice) and their droppings.  ¨ Throw away plants if you see mold on them.  ¨ Keep your house clean and dust free.  ¨ Replace carpet with wood, tile, or vinyl alex. Carpet can trap dander and dust.  ¨ Use allergy-proof pillows, mattress covers, and box spring covers.  ¨ Wash bed sheets and blankets every week in hot water. Dry them in a dryer.  ¨ Use blankets that are made of polyester or cotton.  ¨ Wash hands frequently.  Contact a doctor if:  · You have muscle aches.  · You have chest pain.  · The thick spit you spit or cough up (sputum) changes from clear or white to yellow, green, gray, or bloody.  · The thick spit you spit or cough up gets  thicker.  · There are problems that may be related to the medicine you are given such as:  ¨ A rash.  ¨ Itching.  ¨ Swelling.  ¨ Trouble breathing.  Get help right away if:  · You feel you cannot breathe or catch your breath.  · You cannot stop coughing.  · Your treatment is not helping you breathe better.  · You have very bad chest pain.  This information is not intended to replace advice given to you by your health care provider. Make sure you discuss any questions you have with your health care provider.  Document Released: 10/15/2010 Document Revised: 05/25/2017 Document Reviewed: 06/10/2014  [employer] Interactive Patient Education © 2017 [employer]  Sinusitis, Adult  Sinusitis is soreness and inflammation of your sinuses. Sinuses are hollow spaces in the bones around your face. They are located:  · Around your eyes.  · In the middle of your forehead.  · Behind your nose.  · In your cheekbones.  Your sinuses and nasal passages are lined with a stringy fluid (mucus). Mucus normally drains out of your sinuses. When your nasal tissues get inflamed or swollen, the mucus can get trapped or blocked so air cannot flow through your sinuses. This lets bacteria, viruses, and funguses grow, and that leads to infection.  Follow these instructions at home:  Medicines  · Take, use, or apply over-the-counter and prescription medicines only as told by your doctor. These may include nasal sprays.  · If you were prescribed an antibiotic medicine, take it as told by your doctor. Do not stop taking the antibiotic even if you start to feel better.  Hydrate and Humidify  · Drink enough water to keep your pee (urine) clear or pale yellow.  · Use a cool mist humidifier to keep the humidity level in your home above 50%.  · Breathe in steam for 10-15 minutes, 3-4 times a day or as told by your doctor. You can do this in the bathroom while a hot shower is running.  · Try not to spend time in cool or dry air.  Rest  · Rest as much as  possible.  · Sleep with your head raised (elevated).  · Make sure to get enough sleep each night.  General instructions  · Put a warm, moist washcloth on your face 3-4 times a day or as told by your doctor. This will help with discomfort.  · Wash your hands often with soap and water. If there is no soap and water, use hand .  · Do not smoke. Avoid being around people who are smoking (secondhand smoke).  · Keep all follow-up visits as told by your doctor. This is important.  Contact a doctor if:  · You have a fever.  · Your symptoms get worse.  · Your symptoms do not get better within 10 days.  Get help right away if:  · You have a very bad headache.  · You cannot stop throwing up (vomiting).  · You have pain or swelling around your face or eyes.  · You have trouble seeing.  · You feel confused.  · Your neck is stiff.  · You have trouble breathing.  This information is not intended to replace advice given to you by your health care provider. Make sure you discuss any questions you have with your health care provider.  Document Released: 06/05/2009 Document Revised: 08/13/2017 Document Reviewed: 10/12/2016  [employer] Interactive Patient Education © 2017 [employer]

## 2019-03-30 NOTE — PROGRESS NOTES
Subjective:      Alan Prabhakar Jr. is a 58 y.o. male who presents with Cough (cough, congestion, sinus pressure)            Cough   This is a new problem. The current episode started in the past 7 days. The problem has been gradually worsening. The cough is productive of sputum. Associated symptoms include ear congestion, nasal congestion, postnasal drip, rhinorrhea and wheezing. Pertinent negatives include no chest pain, ear pain, fever, heartburn, hemoptysis, rash, sore throat or shortness of breath. The symptoms are aggravated by cold air and lying down. He has tried a beta-agonist inhaler for the symptoms. The treatment provided mild relief. His past medical history is significant for asthma, bronchitis, COPD and environmental allergies.   Onset associated with 2 days worth of vomiting and diarrhea; resolved.    Review of Systems   Constitutional: Negative for fever.   HENT: Positive for congestion, postnasal drip, rhinorrhea and sinus pain. Negative for ear pain, nosebleeds and sore throat.         Purulent nasal discharge noted.   Respiratory: Positive for cough, sputum production and wheezing. Negative for hemoptysis and shortness of breath.    Cardiovascular: Negative for chest pain.   Gastrointestinal: Negative for heartburn.   Skin: Negative for rash.   Endo/Heme/Allergies: Positive for environmental allergies.     PMH:  has a past medical history of Anxiety (9/7/2012); Arthritis; ASTHMA (9/7/2012); BMI 30.0-30.9,adult (3/17/2015); Chronic lower back pain (9/7/2012); Hypertension; Hypertension (9/7/2012); Influenza; Psychiatric disorder; and Tonsillitis.  MEDS:   Current Outpatient Prescriptions:   •  predniSONE (DELTASONE) 20 MG Tab, Take 2 Tabs by mouth every day., Disp: 10 Tab, Rfl: 0  •  fluticasone (FLOVENT HFA) 220 MCG/ACT Aerosol, Inhale 1 Puff by mouth 2 times a day., Disp: 1 Inhaler, Rfl: 0  •  doxycycline (VIBRAMYCIN) 100 MG Tab, Take 1 Tab by mouth 2 times a day., Disp: 14 Tab, Rfl:  "0  •  Spacer/Aero-Holding Chambers (AEROCHAMBER MV) Misc, 1 Each by Does not apply route Once for 1 dose., Disp: 1 Each, Rfl: 0  •  ondansetron (ZOFRAN) 8 MG Tab, Take 1 Tab by mouth every 8 hours as needed for Nausea/Vomiting., Disp: 15 Tab, Rfl: 0  •  benzonatate (TESSALON) 200 MG capsule, Take 1 Cap by mouth 3 times a day as needed for Cough., Disp: 60 Cap, Rfl: 0  •  oseltamivir (TAMIFLU) 75 MG Cap, Take 1 Cap by mouth 2 times a day., Disp: 10 Cap, Rfl: 0  •  MethylPREDNISolone (MEDROL DOSEPAK) 4 MG Tablet Therapy Pack, Take as directed, Disp: 21 Tab, Rfl: 0  •  omeprazole (PRILOSEC) 20 MG delayed-release capsule, TAKE 1 CAPSULE BY MOUTH TWICE DAILY, Disp: 180 Cap, Rfl: 0  •  losartan-hydrochlorothiazide (HYZAAR) 50-12.5 MG per tablet, Take 1 Tab by mouth every evening., Disp: 90 Tab, Rfl: 0  •  alprazolam (XANAX) 0.25 MG Tab, Take 1 Tab by mouth 1 time daily as needed for Anxiety., Disp: 30 Tab, Rfl: 2  •  fluticasone (FLONASE) 50 MCG/ACT nasal spray, SPRAY 1 SPRAY IN EACH NOSTRIL EVERY DAY, Disp: 3 Bottle, Rfl: 1  •  ibuprofen (MOTRIN) 600 MG Tab, Take 600 mg by mouth every 6 hours as needed for Mild Pain., Disp: , Rfl:   ALLERGIES:   Allergies   Allergen Reactions   • Codeine Hives and Nausea   • Hydrocodone Itching   • Pcn [Penicillins] Unspecified     Pt states \"It's a childhood allergies\".      SURGHX:   Past Surgical History:   Procedure Laterality Date   • KNEE ARTHROSCOPY  6/19/2013    Performed by Jim Davila M.D. at SURGERY [facility] ORS   • MEDIAL MENISCECTOMY  6/19/2013    Performed by Jim Davila M.D. at [facility] ORS   • INJ,EPIDURAL/LUMB/SAC SINGLE  3/5/2012    Performed by GIAN ESCOBAR at [facility]   • TONSILLECTOMY  2004   • SHOULDER ARTHROSCOPY  2004    left   • KNEE ARTHROSCOPY  [id number]    left     SOCHX:  reports that he has been smoking Cigarettes and Cigars.  He started smoking about 41 years ago. He has a 74.00 pack-year smoking history. He has quit " "using smokeless tobacco. He reports that he drinks about 12.6 - 13.2 oz of alcohol per week . He reports that he does not use drugs.  FH: family history includes Colon Cancer in his father; Hypertension in his unknown relative; Lung Disease in his mother.       Objective:     /78 (BP Location: Right arm, Patient Position: Sitting, BP Cuff Size: Small adult)   Pulse 94   Temp 37 °C (98.6 °F) (Temporal)   Resp 18   Ht 1.905 m (6' 3\")   Wt 109.8 kg (242 lb)   SpO2 95%   BMI 30.25 kg/m²      Physical Exam   Constitutional: He is oriented to person, place, and time. He appears well-developed and well-nourished. He is cooperative.  Non-toxic appearance. He does not appear ill. No distress.   HENT:   Head: Normocephalic.   Right Ear: Tympanic membrane and ear canal normal.   Left Ear: Tympanic membrane and ear canal normal.   Nose: Mucosal edema and rhinorrhea present. Right sinus exhibits maxillary sinus tenderness. Right sinus exhibits no frontal sinus tenderness. Left sinus exhibits no maxillary sinus tenderness and no frontal sinus tenderness.   Mouth/Throat: Uvula is midline. No trismus in the jaw. No oropharyngeal exudate, posterior oropharyngeal edema or posterior oropharyngeal erythema.   Eyes: Conjunctivae and lids are normal.   Neck: Trachea normal and phonation normal. Neck supple. No JVD present.   Cardiovascular: Normal rate, regular rhythm and normal heart sounds.    No murmur heard.  No significant pedal edema   Pulmonary/Chest: No accessory muscle usage. No tachypnea. No respiratory distress. He has no decreased breath sounds. He has wheezes. He has rhonchi. He has no rales.   Lymphadenopathy:     He has no cervical adenopathy.   Neurological: He is alert and oriented to person, place, and time.   Skin: Skin is warm and dry.   Psychiatric: He has a normal mood and affect.               Assessment/Plan:     1. Acute exacerbation of COPD with asthma (HCC)  F/U PCP if not resolving 5-7 " days  Continue albuterol  - predniSONE (DELTASONE) 20 MG Tab; Take 2 Tabs by mouth every day.  Dispense: 10 Tab; Refill: 0  - fluticasone (FLOVENT HFA) 220 MCG/ACT Aerosol; Inhale 1 Puff by mouth 2 times a day.  Dispense: 1 Inhaler; Refill: 0  - Spacer/Aero-Holding Chambers (AEROCHAMBER MV) Misc; 1 Each by Does not apply route Once for 1 dose.  Dispense: 1 Each; Refill: 0    2. Acute rhinosinusitis  Based on duration, worsening:  - doxycycline (VIBRAMYCIN) 100 MG Tab; Take 1 Tab by mouth 2 times a day.  Dispense: 14 Tab; Refill: 0    3. Allergic rhinitis, unspecified seasonality, unspecified trigger  Recommended nasal saline irrigation daily, OTC inhaled nasal steroid daily.

## 2019-03-31 ASSESSMENT — ENCOUNTER SYMPTOMS
VOMITING: 1
COUGH: 1
ABDOMINAL PAIN: 0
BLOOD IN STOOL: 0
CONSTIPATION: 0
CHILLS: 1
DIARRHEA: 1
HEADACHES: 1
NAUSEA: 1
SWEATS: 1

## 2019-08-08 ENCOUNTER — TELEPHONE (OUTPATIENT)
Dept: HEALTH INFORMATION MANAGEMENT | Facility: OTHER | Age: 59
End: 2019-08-08

## 2019-10-28 ENCOUNTER — HOSPITAL ENCOUNTER (OUTPATIENT)
Dept: RADIOLOGY | Facility: MEDICAL CENTER | Age: 59
End: 2019-10-28
Attending: PHYSICIAN ASSISTANT
Payer: COMMERCIAL

## 2019-10-28 ENCOUNTER — OFFICE VISIT (OUTPATIENT)
Dept: URGENT CARE | Facility: PHYSICIAN GROUP | Age: 59
End: 2019-10-28
Payer: COMMERCIAL

## 2019-10-28 VITALS
HEART RATE: 92 BPM | BODY MASS INDEX: 30.34 KG/M2 | HEIGHT: 75 IN | SYSTOLIC BLOOD PRESSURE: 138 MMHG | OXYGEN SATURATION: 97 % | DIASTOLIC BLOOD PRESSURE: 90 MMHG | TEMPERATURE: 98.3 F | RESPIRATION RATE: 16 BRPM | WEIGHT: 244 LBS

## 2019-10-28 DIAGNOSIS — S20.211A RIB CONTUSION, RIGHT, INITIAL ENCOUNTER: ICD-10-CM

## 2019-10-28 DIAGNOSIS — S22.31XA CLOSED FRACTURE OF ONE RIB OF RIGHT SIDE, INITIAL ENCOUNTER: ICD-10-CM

## 2019-10-28 DIAGNOSIS — J01.40 ACUTE NON-RECURRENT PANSINUSITIS: ICD-10-CM

## 2019-10-28 PROCEDURE — 71101 X-RAY EXAM UNILAT RIBS/CHEST: CPT | Mod: RT

## 2019-10-28 PROCEDURE — 99214 OFFICE O/P EST MOD 30 MIN: CPT | Performed by: PHYSICIAN ASSISTANT

## 2019-10-28 RX ORDER — TRAMADOL HYDROCHLORIDE 50 MG/1
50 TABLET ORAL EVERY 4 HOURS PRN
Qty: 20 TAB | Refills: 0 | Status: SHIPPED | OUTPATIENT
Start: 2019-10-28 | End: 2019-11-02

## 2019-10-28 RX ORDER — DOXYCYCLINE HYCLATE 100 MG
100 TABLET ORAL 2 TIMES DAILY
Qty: 20 TAB | Refills: 0 | Status: SHIPPED | OUTPATIENT
Start: 2019-10-28 | End: 2019-11-07

## 2019-10-28 RX ORDER — LOSARTAN POTASSIUM AND HYDROCHLOROTHIAZIDE 25; 100 MG/1; MG/1
1 TABLET ORAL DAILY
Refills: 8 | COMMUNITY
Start: 2019-09-06

## 2019-10-28 SDOH — HEALTH STABILITY: MENTAL HEALTH: HOW MANY STANDARD DRINKS CONTAINING ALCOHOL DO YOU HAVE ON A TYPICAL DAY?: 1 OR 2

## 2019-10-28 SDOH — HEALTH STABILITY: MENTAL HEALTH: HOW OFTEN DO YOU HAVE A DRINK CONTAINING ALCOHOL?: MONTHLY OR LESS

## 2019-10-28 SDOH — HEALTH STABILITY: MENTAL HEALTH: HOW OFTEN DO YOU HAVE 6 OR MORE DRINKS ON ONE OCCASION?: NEVER

## 2019-10-28 ASSESSMENT — ENCOUNTER SYMPTOMS
HEADACHES: 1
FEVER: 0
SHORTNESS OF BREATH: 0
ABDOMINAL PAIN: 0
SINUS PRESSURE: 1
SINUS PAIN: 1
ANOREXIA: 0

## 2019-10-29 NOTE — PROGRESS NOTES
Subjective:   Alan Prabhakar Jr. is a 58 y.o. male who presents today with   Chief Complaint   Patient presents with   • Rib Injury     Rib pain R Side x 2 days       Rib Injury   This is a new problem. The current episode started yesterday. The problem occurs constantly. The problem has been unchanged. Associated symptoms include congestion and headaches. Pertinent negatives include no abdominal pain, anorexia, chest pain or fever. He has tried rest for the symptoms. The treatment provided no relief.   Sinus Problem   This is a new problem. The current episode started in the past 7 days. The problem has been gradually worsening since onset. There has been no fever. Associated symptoms include congestion, headaches and sinus pressure. Pertinent negatives include no shortness of breath. Past treatments include nothing. The treatment provided no relief.   Patient states he was in his garage yesterday and tripped over his large dog and fell onto items that were in the garage on the right side of his ribs.  Patient states he was so uncomfortable last night that he could hardly sleep.    PMH:  has a past medical history of Anxiety (9/7/2012), Arthritis, ASTHMA (9/7/2012), BMI 30.0-30.9,adult (3/17/2015), Chronic lower back pain (9/7/2012), Hypertension, Hypertension (9/7/2012), Influenza, Psychiatric disorder, and Tonsillitis.  MEDS:   Current Outpatient Medications:   •  losartan-hydrochlorothiazide (HYZAAR) 100-25 MG per tablet, TK 1 T PO QD, Disp: , Rfl: 8  •  doxycycline (VIBRAMYCIN) 100 MG Tab, Take 1 Tab by mouth 2 times a day for 10 days., Disp: 20 Tab, Rfl: 0  •  tramadol (ULTRAM) 50 MG Tab, Take 1 Tab by mouth every four hours as needed for up to 5 days., Disp: 20 Tab, Rfl: 0  •  omeprazole (PRILOSEC) 20 MG delayed-release capsule, TAKE 1 CAPSULE BY MOUTH TWICE DAILY, Disp: 180 Cap, Rfl: 0  •  losartan-hydrochlorothiazide (HYZAAR) 50-12.5 MG per tablet, Take 1 Tab by mouth every evening., Disp: 90  "Tab, Rfl: 0  •  alprazolam (XANAX) 0.25 MG Tab, Take 1 Tab by mouth 1 time daily as needed for Anxiety., Disp: 30 Tab, Rfl: 2  •  fluticasone (FLONASE) 50 MCG/ACT nasal spray, SPRAY 1 SPRAY IN EACH NOSTRIL EVERY DAY, Disp: 3 Bottle, Rfl: 1  •  ibuprofen (MOTRIN) 600 MG Tab, Take 600 mg by mouth every 6 hours as needed for Mild Pain., Disp: , Rfl:   •  Influenza Virus Vaccine Split (AFLURIA IM), ADM 0.5ML IM UTD, Disp: , Rfl: 0  •  Influenza Vac Split Quad (AFLURIA QUADRIVALENT IM), , Disp: , Rfl:   •  predniSONE (DELTASONE) 20 MG Tab, Take 2 Tabs by mouth every day. (Patient not taking: Reported on 10/28/2019), Disp: 10 Tab, Rfl: 0  •  fluticasone (FLOVENT HFA) 220 MCG/ACT Aerosol, Inhale 1 Puff by mouth 2 times a day. (Patient not taking: Reported on 10/28/2019), Disp: 1 Inhaler, Rfl: 0  •  ondansetron (ZOFRAN) 8 MG Tab, Take 1 Tab by mouth every 8 hours as needed for Nausea/Vomiting. (Patient not taking: Reported on 10/28/2019), Disp: 15 Tab, Rfl: 0  •  benzonatate (TESSALON) 200 MG capsule, Take 1 Cap by mouth 3 times a day as needed for Cough. (Patient not taking: Reported on 10/28/2019), Disp: 60 Cap, Rfl: 0  •  oseltamivir (TAMIFLU) 75 MG Cap, Take 1 Cap by mouth 2 times a day. (Patient not taking: Reported on 10/28/2019), Disp: 10 Cap, Rfl: 0  •  MethylPREDNISolone (MEDROL DOSEPAK) 4 MG Tablet Therapy Pack, Take as directed (Patient not taking: Reported on 10/28/2019), Disp: 21 Tab, Rfl: 0  ALLERGIES:   Allergies   Allergen Reactions   • Codeine Hives and Nausea   • Hydrocodone Itching   • Pcn [Penicillins] Unspecified     Pt states \"It's a childhood allergies\".      SURGHX:   Past Surgical History:   Procedure Laterality Date   • KNEE ARTHROSCOPY  6/19/2013    Performed by Jim [facility]ila M.D. at [facility]   • MEDIAL MENISCECTOMY  6/19/2013    Performed by Jim Davila M.D. at SURGERY [facility]   • INJ,EPIDURAL/LUMB/SAC SINGLE  3/5/2012    Performed by GIAN ESCOBAR at [facility]   • TONSILLECTOMY  2004   • SHOULDER ARTHROSCOPY  2004    left   • KNEE ARTHROSCOPY  [id number]    left     SOCHX:  reports that he has been smoking cigarettes and cigars. He started smoking about 42 years ago. He has a 74.00 pack-year smoking history. He has quit using smokeless tobacco. He reports that he drinks about 12.6 - 13.2 oz of alcohol per week. He reports that he does not use drugs.  FH: Reviewed with patient, not pertinent to this visit.       Review of Systems   Constitutional: Negative for fever.   HENT: Positive for congestion, sinus pressure and sinus pain.    Respiratory: Negative for shortness of breath.    Cardiovascular: Negative for chest pain.   Gastrointestinal: Negative for abdominal pain and anorexia.   Musculoskeletal:        Right rib pain   Neurological: Positive for headaches.   All other systems reviewed and are negative.       Objective:   /90 (BP Location: Left arm, Patient Position: Sitting, BP Cuff Size: Adult)   Pulse 92   Temp 36.8 °C (98.3 °F) (Temporal)   Resp 16   Ht 1.905 m (6' 3\")   Wt 110.7 kg (244 lb)   SpO2 97%   BMI 30.50 kg/m²   Physical Exam   Constitutional: Vital signs are normal. He appears well-developed and well-nourished. No distress.   HENT:   Head: Normocephalic and atraumatic.   Right Ear: Hearing normal.   Left Ear: Hearing normal.   Tenderness to palpation of maxillary/frontal sinuses   Eyes: Pupils are equal, round, and reactive to light.   Cardiovascular: Normal rate, regular rhythm and normal heart sounds.   Pulmonary/Chest: Effort normal. No stridor. He has no wheezes. He has no rales.   Decreased inspiratory depth secondary to right-sided rib pain   Musculoskeletal:        Arms:  Significant tenderness to palpation along right-sided lower 2 ribs.   Neurological: He is alert. Coordination normal.   Skin: Skin is warm and dry.   Psychiatric: He has a normal mood and affect.   Nursing note and vitals reviewed.    DX " RIBS  FINDINGS:  Cardiomediastinal contour is within normal limits.  No focal pulmonary consolidation.  No pleural fluid collection or pneumothorax.  Minimal deformity of lateral RIGHT 8th rib.      Impression       1.  Possible minimally displaced fracture of the lateral RIGHT 8th rib.  2.  No associated pleural fluid or pneumothorax       Assessment/Plan:   Assessment    1. Rib contusion, right, initial encounter  - [id number] (WITH 1-VIEW CXR) RIGHT; Future    2. Closed fracture of one rib of right side, initial encounter  - tramadol (ULTRAM) 50 MG Tab; Take 1 Tab by mouth every four hours as needed for up to 5 days.  Dispense: 20 Tab; Refill: 0    3. Acute non-recurrent pansinusitis  - doxycycline (VIBRAMYCIN) 100 MG Tab; Take 1 Tab by mouth 2 times a day for 10 days.  Dispense: 20 Tab; Refill: 0    Other orders  - Influenza Virus Vaccine Split (AFLURIA IM); ADM 0.5ML IM UTD; Refill: 0  - Influenza Vac Split Quad (AFLURIA QUADRIVALENT IM)  - losartan-hydrochlorothiazide (HYZAAR) 100-25 MG per tablet; TK 1 T PO QD; Refill: 8  - Consent for Opiate Prescription  Differential diagnosis, natural history, supportive care, and indications for immediate follow-up discussed.   In prescribing controlled substances to this patient, I certify that I have obtained and reviewed the medical history of Alan Guzmánvivien Prabhakar Jr.. I have also made a good allen effort to obtain applicable records from other providers who have treated the patient and records did not demonstrate any increased risk of substance abuse that would prevent me from prescribing controlled substances.     I have conducted a physical exam and documented it. I have reviewed Mr. Prabhakar’s prescription history as maintained by the [place] Prescription Monitoring Program.     I have assessed the patient’s risk for abuse, dependency, and addiction using the validated Opioid Risk Tool available at  https://www.mdcalc.com/tiguwr-wqgy-ylif-ort-narcotic-abuse.     Given the above, I believe the benefits of controlled substance therapy outweigh the risks. The reasons for prescribing controlled substances include non-narcotic, oral analgesic alternatives have been inadequate for pain control. Accordingly, I have discussed the risk and benefits, treatment plan, and alternative therapies with the patient.       Patient given instructions and understanding of medications and treatment.    If not improving in 3-5 days, F/U with PCP or return to  if symptoms worsen.  Strict ER precautions given.  Patient agreeable to plan.      Please note that this dictation was created using voice recognition software. I have made every reasonable attempt to correct obvious errors, but I expect that there are errors of grammar and possibly content that I did not discover before finalizing the note.    Jose Hua PA-C

## 2020-01-14 ENCOUNTER — OFFICE VISIT (OUTPATIENT)
Dept: URGENT CARE | Facility: PHYSICIAN GROUP | Age: 60
End: 2020-01-14
Payer: COMMERCIAL

## 2020-01-14 VITALS
TEMPERATURE: 97.6 F | SYSTOLIC BLOOD PRESSURE: 154 MMHG | HEIGHT: 75 IN | RESPIRATION RATE: 18 BRPM | DIASTOLIC BLOOD PRESSURE: 92 MMHG | WEIGHT: 244.8 LBS | OXYGEN SATURATION: 95 % | HEART RATE: 95 BPM | BODY MASS INDEX: 30.44 KG/M2

## 2020-01-14 DIAGNOSIS — J02.9 PHARYNGITIS, UNSPECIFIED ETIOLOGY: ICD-10-CM

## 2020-01-14 DIAGNOSIS — J32.0 LEFT MAXILLARY SINUSITIS: ICD-10-CM

## 2020-01-14 LAB
INT CON NEG: NEGATIVE
INT CON POS: POSITIVE
S PYO AG THROAT QL: NEGATIVE

## 2020-01-14 PROCEDURE — 99214 OFFICE O/P EST MOD 30 MIN: CPT | Performed by: PHYSICIAN ASSISTANT

## 2020-01-14 PROCEDURE — 87880 STREP A ASSAY W/OPTIC: CPT | Performed by: PHYSICIAN ASSISTANT

## 2020-01-14 RX ORDER — DOXYCYCLINE HYCLATE 100 MG
100 TABLET ORAL 2 TIMES DAILY
Qty: 14 TAB | Refills: 0 | Status: SHIPPED | OUTPATIENT
Start: 2020-01-14 | End: 2020-01-21

## 2020-01-14 ASSESSMENT — ENCOUNTER SYMPTOMS
EYES NEGATIVE: 1
CHILLS: 1
NAUSEA: 1
FEVER: 0
SORE THROAT: 1
HEMOPTYSIS: 0
VOMITING: 0
WHEEZING: 0
SPUTUM PRODUCTION: 0
SINUS PAIN: 1
SHORTNESS OF BREATH: 0
COUGH: 1
HEADACHES: 1
ABDOMINAL PAIN: 0

## 2020-01-14 NOTE — LETTER
formerly [facility] [address] [address]     January 14, 2020    Patient: Alan Prabhakar Jr.   YOB: 1960   Date of Visit: 1/14/2020       To Whom It May Concern:    Alan Prabhakar was seen and treated in our department on 1/14/2020 for viral upper respiratory infection and sinus infection. Due to contagiousness, please excuse from work today and tomorrow 1/15/20.     Sincerely,     Olaf Patricio P.A.-C.

## 2020-01-15 NOTE — PROGRESS NOTES
"Subjective:      Alan Prabhakar Jr. is a 59 y.o. male who presents with Sore Throat (x 6 days); Cough; Ear Pain; Headache; Fever; and Chills          Headache    This is a new problem. The current episode started in the past 7 days. Associated symptoms include coughing, ear pain, nausea and a sore throat. Pertinent negatives include no abdominal pain, fever or vomiting.   Pharyngitis    Associated symptoms include congestion, coughing, ear pain and headaches. Pertinent negatives include no abdominal pain, shortness of breath or vomiting.     Left gland pain and left ear pain. Sore throat.   Sleeping provides mild relief. Drinking water, smoothies.   Symptoms started about 6 days ago.  Worse with body chills, headache, sore throat about 4 to 5 days ago.  Symptoms persisted with headache sore throat, left ear pain, mild cough nonproductive, and chills.  Denies any difficulty breathing, abdominal pain nausea vomiting, visual changes.    Review of Systems   Constitutional: Positive for chills and malaise/fatigue. Negative for fever.   HENT: Positive for congestion, ear pain, sinus pain and sore throat.         Left neck gland pain   Eyes: Negative.    Respiratory: Positive for cough. Negative for hemoptysis, sputum production, shortness of breath and wheezing.    Cardiovascular: Negative for chest pain.   Gastrointestinal: Positive for nausea. Negative for abdominal pain and vomiting.          Objective:     /92 (BP Location: Right arm, Patient Position: Sitting, BP Cuff Size: Adult)   Pulse 95   Temp 36.4 °C (97.6 °F) (Temporal)   Resp 18   Ht 1.905 m (6' 3\")   Wt 111 kg (244 lb 12.8 oz)   SpO2 95%   BMI 30.60 kg/m²      Physical Exam  Vitals signs reviewed.   Constitutional:       Appearance: Normal appearance.   HENT:      Right Ear: Tympanic membrane, ear canal and external ear normal. Tympanic membrane is not injected or erythematous.      Left Ear: Tympanic membrane, ear canal and external " ear normal. Tympanic membrane is not injected or erythematous.      Nose: Mucosal edema and rhinorrhea present. Rhinorrhea is clear.      Right Sinus: No maxillary sinus tenderness or frontal sinus tenderness.      Left Sinus: Maxillary sinus tenderness and frontal sinus tenderness present.      Mouth/Throat:      Pharynx: Uvula midline. Pharyngeal swelling and posterior oropharyngeal erythema present. No oropharyngeal exudate or uvula swelling.   Cardiovascular:      Rate and Rhythm: Normal rate and regular rhythm.      Heart sounds: Normal heart sounds.   Pulmonary:      Effort: Pulmonary effort is normal. No respiratory distress.      Breath sounds: Normal breath sounds. No wheezing, rhonchi or rales.   Lymphadenopathy:      Cervical: No cervical adenopathy.   Skin:     General: Skin is warm and dry.   Neurological:      Mental Status: He is alert and oriented to person, place, and time.   Psychiatric:         Mood and Affect: Mood normal.         Behavior: Behavior normal.         Thought Content: Thought content normal.       Past Medical History:   Diagnosis Date   • Anxiety 9/7/2012   • Arthritis     knee   • ASTHMA 9/7/2012   • BMI 30.0-30.9,adult 3/17/2015   • Chronic lower back pain 9/7/2012   • Hypertension    • Hypertension 9/7/2012   • Influenza    • Psychiatric disorder    • Tonsillitis       Past Surgical History:   Procedure Laterality Date   • KNEE ARTHROSCOPY  6/19/2013    Performed by Jim Davila M.D. at SURGERY [facility] ORS   • MEDIAL MENISCECTOMY  6/19/2013    Performed by Jim Davila M.D. at [facility] ORS   • INJ,EPIDURAL/LUMB/SAC SINGLE  3/5/2012    Performed by GIAN ESCOBAR at SURGERY [facility] ORS   • TONSILLECTOMY  2004   • SHOULDER ARTHROSCOPY  2004    left   • KNEE ARTHROSCOPY  [id number]    left      Social History     Socioeconomic History   • Marital status:      Spouse name: Not on file   • Number of children: Not on file   • Years of education: Not on  file   • Highest education level: Not on file   Occupational History   • Not on file   Social Needs   • Financial resource strain: Not on file   • Food insecurity:     Worry: Not on file     Inability: Not on file   • Transportation needs:     Medical: Not on file     Non-medical: Not on file   Tobacco Use   • Smoking status: Current Every Day Smoker     Packs/day: 2.00     Years: 37.00     Pack years: 74.00     Types: Cigarettes, Cigars     Start date: 9/10/1977   • Smokeless tobacco: Former User   • Tobacco comment: from 2 pack decreased to pack a day.  Started at 15yo. Patient is smoking one cigar   Substance and Sexual Activity   • Alcohol use: Yes     Alcohol/week: 12.6 - 13.2 oz     Types: 1 - 2 Cans of beer, 20 Standard drinks or equivalent per week     Frequency: Monthly or less     Drinks per session: 1 or 2     Binge frequency: Never   • Drug use: No   • Sexual activity: Yes     Comment:    Lifestyle   • Physical activity:     Days per week: Not on file     Minutes per session: Not on file   • Stress: Not on file   Relationships   • Social connections:     Talks on phone: Not on file     Gets together: Not on file     Attends Judaism service: Not on file     Active member of club or organization: Not on file     Attends meetings of clubs or organizations: Not on file     Relationship status: Not on file   • Intimate partner violence:     Fear of current or ex partner: Not on file     Emotionally abused: Not on file     Physically abused: Not on file     Forced sexual activity: Not on file   Other Topics Concern   • Not on file   Social History Narrative   • Not on file    Codeine; Hydrocodone; and Pcn [penicillins]            Assessment/Plan:     1. Pharyngitis, unspecified etiology    - POCT Rapid Strep A - Negative    2. Left maxillary sinusitis    - doxycycline (VIBRAMYCIN) 100 MG Tab; Take 1 Tab by mouth 2 times a day for 7 days.  Dispense: 14 Tab; Refill: 0    Discussed with patient signs  symptoms consistent with left maxillary sinusitis.  He has had severe left maxillary sinus pain a couple days ago that have persisted with associated left ear fullness and pain.  Due to the severity of symptoms, we will treat for bacterial.  Strep A negative, pharyngitis most likely viral etiology.  He has had tonsillectomy in the past and has not had strep throat in a long time.  Suspicions for bacterial etiology are low.    Encourage plenty fluids, nasal saline washes, Mucinex, OTC cough medicine, Flonase, ibuprofen/Tylenol for pain or fever.    Advised to return to the clinic if any worsening symptoms such as severe headache, vision changes, persistent fever or any other concerns.     Supportive care, differential diagnoses, and indications for immediate follow-up discussed with patient.    Pathogenesis of diagnosis discussed including typical length and natural progression. Patient expresses understanding and agrees to plan.    Please note that this dictation was created using voice recognition software. I have made every reasonable attempt to correct obvious errors, but I expect that there are errors of grammar and possibly content that I did not discover before finalizing the note.

## 2020-11-05 ENCOUNTER — OFFICE VISIT (OUTPATIENT)
Dept: URGENT CARE | Facility: PHYSICIAN GROUP | Age: 60
End: 2020-11-05
Payer: COMMERCIAL

## 2020-11-05 VITALS
TEMPERATURE: 97 F | WEIGHT: 246 LBS | RESPIRATION RATE: 17 BRPM | DIASTOLIC BLOOD PRESSURE: 92 MMHG | HEIGHT: 75 IN | OXYGEN SATURATION: 96 % | BODY MASS INDEX: 30.59 KG/M2 | SYSTOLIC BLOOD PRESSURE: 132 MMHG | HEART RATE: 91 BPM

## 2020-11-05 DIAGNOSIS — H65.02 NON-RECURRENT ACUTE SEROUS OTITIS MEDIA OF LEFT EAR: ICD-10-CM

## 2020-11-05 DIAGNOSIS — I10 ESSENTIAL HYPERTENSION: ICD-10-CM

## 2020-11-05 PROCEDURE — 99214 OFFICE O/P EST MOD 30 MIN: CPT | Performed by: PHYSICIAN ASSISTANT

## 2020-11-05 RX ORDER — DOXYCYCLINE HYCLATE 100 MG
100 TABLET ORAL 2 TIMES DAILY
Qty: 10 TAB | Refills: 0 | Status: SHIPPED | OUTPATIENT
Start: 2020-11-05 | End: 2020-11-10

## 2020-11-05 ASSESSMENT — ENCOUNTER SYMPTOMS
PALPITATIONS: 0
HEADACHES: 1
CHILLS: 0
FEVER: 0

## 2020-11-05 NOTE — LETTER
November 5, 2020         Patient: Alan Prabhakar .   YOB: 1960   Date of Visit: 11/5/2020           To Whom it May Concern:    Alan Prabhakar was seen in my clinic on 11/5/2020. He can return to work 11/9/2020.    If you have any questions or concerns, please don't hesitate to call.        Sincerely,           Jose Hua P.A.-C.  Electronically Signed

## 2020-11-06 NOTE — PROGRESS NOTES
Subjective:   Alan Prabhakar Jr. is a 59 y.o. male who presents today with   Chief Complaint   Patient presents with   • Otalgia     clicking noise left side x 3 weeks       Otalgia   There is pain in the left ear. This is a new problem. The current episode started 1 to 4 weeks ago. The problem occurs constantly. The problem has been waxing and waning. The pain is moderate. Associated symptoms include headaches. He has tried nothing for the symptoms. The treatment provided no relief.     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  He states that he tracks his blood pressure at home and it has been slightly elevated.  PMH:  has a past medical history of Anxiety (9/7/2012), Arthritis, ASTHMA (9/7/2012), BMI 30.0-30.9,adult (3/17/2015), Chronic lower back pain (9/7/2012), Hypertension, Hypertension (9/7/2012), Influenza, Psychiatric disorder, and Tonsillitis.  MEDS:   Current Outpatient Medications:   •  doxycycline (VIBRAMYCIN) 100 MG Tab, Take 1 Tab by mouth 2 times a day for 5 days., Disp: 10 Tab, Rfl: 0  •  Influenza Vac Split Quad (AFLURIA QUADRIVALENT IM), , Disp: , Rfl:   •  losartan-hydrochlorothiazide (HYZAAR) 100-25 MG per tablet, TK 1 T PO QD, Disp: , Rfl: 8  •  omeprazole (PRILOSEC) 20 MG delayed-release capsule, TAKE 1 CAPSULE BY MOUTH TWICE DAILY, Disp: 180 Cap, Rfl: 0  •  alprazolam (XANAX) 0.25 MG Tab, Take 1 Tab by mouth 1 time daily as needed for Anxiety., Disp: 30 Tab, Rfl: 2  •  fluticasone (FLONASE) 50 MCG/ACT nasal spray, SPRAY 1 SPRAY IN EACH NOSTRIL EVERY DAY, Disp: 3 Bottle, Rfl: 1  •  Influenza Virus Vaccine Split (AFLURIA IM), ADM 0.5ML IM UTD, Disp: , Rfl: 0  •  ibuprofen (MOTRIN) 600 MG Tab, Take 600 mg by mouth every 6 hours as needed for Mild Pain., Disp: , Rfl:   ALLERGIES:   Allergies   Allergen Reactions   • Codeine Hives and Nausea   • Hydrocodone Itching   • Pcn [Penicillins]  "Unspecified     Pt states \"It's a childhood allergies\".      SURGHX:   Past Surgical History:   Procedure Laterality Date   • KNEE ARTHROSCOPY  6/19/2013    Performed by Jim Davila M.D. at SURGERY [facility] ORS   • MEDIAL MENISCECTOMY  6/19/2013    Performed by Jim Davlia M.D. at SURGERY [facility] ORS   • INJ,EPIDURAL/LUMB/SAC SINGLE  3/5/2012    Performed by GIAN ESCOBAR at SURGERY [facility] ORS   • TONSILLECTOMY  2004   • SHOULDER ARTHROSCOPY  2004    left   • KNEE ARTHROSCOPY  [id number]    left     SOCHX:  reports that he has been smoking cigarettes and cigars. He started smoking about 43 years ago. He has a 74.00 pack-year smoking history. He has quit using smokeless tobacco. He reports current alcohol use of about 12.6 - 13.2 oz of alcohol per week. He reports that he does not use drugs.  FH: Reviewed with patient, not pertinent to this visit.       Review of Systems   Constitutional: Positive for malaise/fatigue. Negative for chills and fever.   HENT: Positive for ear pain.    Cardiovascular: Negative for chest pain and palpitations.   Neurological: Positive for headaches.   All other systems reviewed and are negative.       Objective:   /92   Pulse 91   Temp 36.1 °C (97 °F)   Resp 17   Ht 1.905 m (6' 3\")   Wt 111.6 kg (246 lb)   SpO2 96%   BMI 30.75 kg/m²   Physical Exam  Vitals signs and nursing note reviewed.   Constitutional:       General: He is not in acute distress.     Appearance: Normal appearance. He is well-developed and normal weight. He is not ill-appearing or toxic-appearing.   HENT:      Head: Normocephalic and atraumatic.      Right Ear: Hearing, tympanic membrane and ear canal normal.      Left Ear: Hearing and ear canal normal. A middle ear effusion is present. Tympanic membrane is erythematous and bulging.   Eyes:      Extraocular Movements: Extraocular movements intact.      Conjunctiva/sclera: Conjunctivae normal.      Pupils: Pupils are equal, round, and " reactive to light.   Cardiovascular:      Rate and Rhythm: Normal rate and regular rhythm.      Heart sounds: Normal heart sounds.   Pulmonary:      Effort: Pulmonary effort is normal.      Breath sounds: No stridor. No wheezing, rhonchi or rales.   Musculoskeletal:      Comments: Normal movement in all 4 extremities   Skin:     General: Skin is warm and dry.   Neurological:      Mental Status: He is alert.      Coordination: Coordination normal.   Psychiatric:         Mood and Affect: Mood normal.       Assessment/Plan:   Assessment    1. Non-recurrent acute serous otitis media of left ear  - doxycycline (VIBRAMYCIN) 100 MG Tab; Take 1 Tab by mouth 2 times a day for 5 days.  Dispense: 10 Tab; Refill: 0    2. Essential hypertension  - REFERRAL TO FOLLOW-UP WITH PRIMARY CARE  Discussed slightly elevated blood pressure with patient as well and recommend that he track his blood pressure twice daily.  Discussed that the ear clicking or tendinitis can also be from elevated blood pressure and he should closely monitor this.  Patient has no headaches or dizziness upon exam today.   Encouraged use of antihistamine to help with inner ear fluid.  Differential diagnosis, natural history, supportive care, and indications for immediate follow-up discussed.   Patient given instructions and understanding of medications and treatment.    If not improving in 3-5 days, F/U with PCP or return to  if symptoms worsen.    Patient agreeable to plan.      Please note that this dictation was created using voice recognition software. I have made every reasonable attempt to correct obvious errors, but I expect that there are errors of grammar and possibly content that I did not discover before finalizing the note.    Jose Hua PA-C

## 2021-03-15 DIAGNOSIS — Z23 NEED FOR VACCINATION: ICD-10-CM

## 2021-07-27 ENCOUNTER — HOSPITAL ENCOUNTER (OUTPATIENT)
Dept: RADIOLOGY | Facility: MEDICAL CENTER | Age: 61
End: 2021-07-27
Attending: PHYSICIAN ASSISTANT
Payer: COMMERCIAL

## 2021-07-27 DIAGNOSIS — M54.6 PAIN IN THORACIC SPINE: ICD-10-CM

## 2021-07-27 DIAGNOSIS — M54.2 CERVICALGIA: ICD-10-CM

## 2021-07-27 DIAGNOSIS — M54.16 LUMBAR RADICULOPATHY: ICD-10-CM

## 2021-07-27 PROCEDURE — 72110 X-RAY EXAM L-2 SPINE 4/>VWS: CPT

## 2021-07-27 PROCEDURE — 72072 X-RAY EXAM THORAC SPINE 3VWS: CPT

## 2021-07-27 PROCEDURE — 72050 X-RAY EXAM NECK SPINE 4/5VWS: CPT

## 2021-09-22 ENCOUNTER — APPOINTMENT (OUTPATIENT)
Dept: RADIOLOGY | Facility: MEDICAL CENTER | Age: 61
End: 2021-09-22
Attending: NURSE PRACTITIONER
Payer: COMMERCIAL

## 2021-09-22 DIAGNOSIS — M54.16 LUMBAR RADICULOPATHY: ICD-10-CM

## 2021-09-22 DIAGNOSIS — M54.12 BRACHIAL NEURITIS: ICD-10-CM

## 2021-09-22 PROCEDURE — 72141 MRI NECK SPINE W/O DYE: CPT

## 2021-09-22 PROCEDURE — 72148 MRI LUMBAR SPINE W/O DYE: CPT

## 2021-10-26 ENCOUNTER — PRE-ADMISSION TESTING (OUTPATIENT)
Dept: ADMISSIONS | Facility: MEDICAL CENTER | Age: 61
End: 2021-10-26
Attending: NEUROLOGICAL SURGERY
Payer: COMMERCIAL

## 2021-10-26 ENCOUNTER — HOSPITAL ENCOUNTER (OUTPATIENT)
Dept: RADIOLOGY | Facility: MEDICAL CENTER | Age: 61
End: 2021-10-26
Attending: NEUROLOGICAL SURGERY
Payer: COMMERCIAL

## 2021-10-26 DIAGNOSIS — Z01.811 PRE-OPERATIVE RESPIRATORY EXAMINATION: ICD-10-CM

## 2021-10-26 DIAGNOSIS — Z01.810 PRE-OPERATIVE CARDIOVASCULAR EXAMINATION: ICD-10-CM

## 2021-10-26 DIAGNOSIS — Z01.812 PRE-OPERATIVE LABORATORY EXAMINATION: ICD-10-CM

## 2021-10-26 LAB
ANION GAP SERPL CALC-SCNC: 11 MMOL/L (ref 7–16)
APTT PPP: 30.5 SEC (ref 24.7–36)
BASOPHILS # BLD AUTO: 0.8 % (ref 0–1.8)
BASOPHILS # BLD: 0.08 K/UL (ref 0–0.12)
BUN SERPL-MCNC: 17 MG/DL (ref 8–22)
CALCIUM SERPL-MCNC: 10.1 MG/DL (ref 8.5–10.5)
CHLORIDE SERPL-SCNC: 102 MMOL/L (ref 96–112)
CO2 SERPL-SCNC: 25 MMOL/L (ref 20–33)
CREAT SERPL-MCNC: 0.84 MG/DL (ref 0.5–1.4)
EKG IMPRESSION: NORMAL
EOSINOPHIL # BLD AUTO: 0.27 K/UL (ref 0–0.51)
EOSINOPHIL NFR BLD: 2.8 % (ref 0–6.9)
ERYTHROCYTE [DISTWIDTH] IN BLOOD BY AUTOMATED COUNT: 48.3 FL (ref 35.9–50)
GLUCOSE SERPL-MCNC: 111 MG/DL (ref 65–99)
HCT VFR BLD AUTO: 51.2 % (ref 42–52)
HGB BLD-MCNC: 17.4 G/DL (ref 14–18)
IMM GRANULOCYTES # BLD AUTO: 0.04 K/UL (ref 0–0.11)
IMM GRANULOCYTES NFR BLD AUTO: 0.4 % (ref 0–0.9)
INR PPP: 0.98 (ref 0.87–1.13)
LYMPHOCYTES # BLD AUTO: 2.21 K/UL (ref 1–4.8)
LYMPHOCYTES NFR BLD: 23 % (ref 22–41)
MCH RBC QN AUTO: 32 PG (ref 27–33)
MCHC RBC AUTO-ENTMCNC: 34 G/DL (ref 33.7–35.3)
MCV RBC AUTO: 94.3 FL (ref 81.4–97.8)
MONOCYTES # BLD AUTO: 1.22 K/UL (ref 0–0.85)
MONOCYTES NFR BLD AUTO: 12.7 % (ref 0–13.4)
NEUTROPHILS # BLD AUTO: 5.77 K/UL (ref 1.82–7.42)
NEUTROPHILS NFR BLD: 60.3 % (ref 44–72)
NRBC # BLD AUTO: 0 K/UL
NRBC BLD-RTO: 0 /100 WBC
PLATELET # BLD AUTO: 296 K/UL (ref 164–446)
PMV BLD AUTO: 10.7 FL (ref 9–12.9)
POTASSIUM SERPL-SCNC: 4 MMOL/L (ref 3.6–5.5)
PROTHROMBIN TIME: 12.7 SEC (ref 12–14.6)
RBC # BLD AUTO: 5.43 M/UL (ref 4.7–6.1)
SARS-COV-2 RNA RESP QL NAA+PROBE: NOTDETECTED
SODIUM SERPL-SCNC: 138 MMOL/L (ref 135–145)
SPECIMEN SOURCE: NORMAL
WBC # BLD AUTO: 9.6 K/UL (ref 4.8–10.8)

## 2021-10-26 PROCEDURE — 85025 COMPLETE CBC W/AUTO DIFF WBC: CPT

## 2021-10-26 PROCEDURE — U0005 INFEC AGEN DETEC AMPLI PROBE: HCPCS

## 2021-10-26 PROCEDURE — 36415 COLL VENOUS BLD VENIPUNCTURE: CPT

## 2021-10-26 PROCEDURE — 93005 ELECTROCARDIOGRAM TRACING: CPT

## 2021-10-26 PROCEDURE — C9803 HOPD COVID-19 SPEC COLLECT: HCPCS

## 2021-10-26 PROCEDURE — 71046 X-RAY EXAM CHEST 2 VIEWS: CPT

## 2021-10-26 PROCEDURE — 93010 ELECTROCARDIOGRAM REPORT: CPT | Performed by: INTERNAL MEDICINE

## 2021-10-26 PROCEDURE — 85730 THROMBOPLASTIN TIME PARTIAL: CPT

## 2021-10-26 PROCEDURE — 85610 PROTHROMBIN TIME: CPT

## 2021-10-26 PROCEDURE — 80048 BASIC METABOLIC PNL TOTAL CA: CPT

## 2021-10-26 PROCEDURE — U0003 INFECTIOUS AGENT DETECTION BY NUCLEIC ACID (DNA OR RNA); SEVERE ACUTE RESPIRATORY SYNDROME CORONAVIRUS 2 (SARS-COV-2) (CORONAVIRUS DISEASE [COVID-19]), AMPLIFIED PROBE TECHNIQUE, MAKING USE OF HIGH THROUGHPUT TECHNOLOGIES AS DESCRIBED BY CMS-2020-01-R: HCPCS

## 2021-11-02 ENCOUNTER — APPOINTMENT (OUTPATIENT)
Dept: RADIOLOGY | Facility: MEDICAL CENTER | Age: 61
End: 2021-11-02
Attending: NEUROLOGICAL SURGERY
Payer: COMMERCIAL

## 2021-11-02 ENCOUNTER — ANESTHESIA (OUTPATIENT)
Dept: SURGERY | Facility: MEDICAL CENTER | Age: 61
End: 2021-11-02
Payer: COMMERCIAL

## 2021-11-02 ENCOUNTER — ANESTHESIA EVENT (OUTPATIENT)
Dept: SURGERY | Facility: MEDICAL CENTER | Age: 61
End: 2021-11-02
Payer: COMMERCIAL

## 2021-11-02 ENCOUNTER — HOSPITAL ENCOUNTER (OUTPATIENT)
Facility: MEDICAL CENTER | Age: 61
End: 2021-11-04
Attending: NEUROLOGICAL SURGERY | Admitting: NEUROLOGICAL SURGERY
Payer: COMMERCIAL

## 2021-11-02 DIAGNOSIS — G89.18 POSTOPERATIVE PAIN: ICD-10-CM

## 2021-11-02 PROCEDURE — 700102 HCHG RX REV CODE 250 W/ 637 OVERRIDE(OP): Performed by: ANESTHESIOLOGY

## 2021-11-02 PROCEDURE — 160035 HCHG PACU - 1ST 60 MINS PHASE I: Performed by: NEUROLOGICAL SURGERY

## 2021-11-02 PROCEDURE — 72020 X-RAY EXAM OF SPINE 1 VIEW: CPT

## 2021-11-02 PROCEDURE — 700111 HCHG RX REV CODE 636 W/ 250 OVERRIDE (IP): Performed by: ANESTHESIOLOGY

## 2021-11-02 PROCEDURE — 160036 HCHG PACU - EA ADDL 30 MINS PHASE I: Performed by: NEUROLOGICAL SURGERY

## 2021-11-02 PROCEDURE — 160041 HCHG SURGERY MINUTES - EA ADDL 1 MIN LEVEL 4: Performed by: NEUROLOGICAL SURGERY

## 2021-11-02 PROCEDURE — 700105 HCHG RX REV CODE 258: Performed by: NEUROLOGICAL SURGERY

## 2021-11-02 PROCEDURE — 500367 HCHG DRAIN KIT, HEMOVAC: Performed by: NEUROLOGICAL SURGERY

## 2021-11-02 PROCEDURE — G0378 HOSPITAL OBSERVATION PER HR: HCPCS

## 2021-11-02 PROCEDURE — 700101 HCHG RX REV CODE 250: Performed by: NEUROLOGICAL SURGERY

## 2021-11-02 PROCEDURE — 160029 HCHG SURGERY MINUTES - 1ST 30 MINS LEVEL 4: Performed by: NEUROLOGICAL SURGERY

## 2021-11-02 PROCEDURE — 96365 THER/PROPH/DIAG IV INF INIT: CPT

## 2021-11-02 PROCEDURE — A9270 NON-COVERED ITEM OR SERVICE: HCPCS | Performed by: ANESTHESIOLOGY

## 2021-11-02 PROCEDURE — 160048 HCHG OR STATISTICAL LEVEL 1-5: Performed by: NEUROLOGICAL SURGERY

## 2021-11-02 PROCEDURE — 96376 TX/PRO/DX INJ SAME DRUG ADON: CPT

## 2021-11-02 PROCEDURE — 160009 HCHG ANES TIME/MIN: Performed by: NEUROLOGICAL SURGERY

## 2021-11-02 PROCEDURE — 110454 HCHG SHELL REV 250: Performed by: NEUROLOGICAL SURGERY

## 2021-11-02 PROCEDURE — 700102 HCHG RX REV CODE 250 W/ 637 OVERRIDE(OP): Performed by: PHYSICIAN ASSISTANT

## 2021-11-02 PROCEDURE — 501838 HCHG SUTURE GENERAL: Performed by: NEUROLOGICAL SURGERY

## 2021-11-02 PROCEDURE — 700111 HCHG RX REV CODE 636 W/ 250 OVERRIDE (IP): Performed by: NEUROLOGICAL SURGERY

## 2021-11-02 PROCEDURE — 700101 HCHG RX REV CODE 250: Performed by: ANESTHESIOLOGY

## 2021-11-02 PROCEDURE — 700101 HCHG RX REV CODE 250: Performed by: PHYSICIAN ASSISTANT

## 2021-11-02 PROCEDURE — 700111 HCHG RX REV CODE 636 W/ 250 OVERRIDE (IP): Performed by: PHYSICIAN ASSISTANT

## 2021-11-02 PROCEDURE — 96375 TX/PRO/DX INJ NEW DRUG ADDON: CPT

## 2021-11-02 PROCEDURE — A9270 NON-COVERED ITEM OR SERVICE: HCPCS | Performed by: PHYSICIAN ASSISTANT

## 2021-11-02 PROCEDURE — 160002 HCHG RECOVERY MINUTES (STAT): Performed by: NEUROLOGICAL SURGERY

## 2021-11-02 RX ORDER — CEFAZOLIN SODIUM 1 G/3ML
INJECTION, POWDER, FOR SOLUTION INTRAMUSCULAR; INTRAVENOUS
Status: DISCONTINUED | OUTPATIENT
Start: 2021-11-02 | End: 2021-11-02 | Stop reason: HOSPADM

## 2021-11-02 RX ORDER — HYDROCHLOROTHIAZIDE 25 MG/1
25 TABLET ORAL
Status: DISCONTINUED | OUTPATIENT
Start: 2021-11-03 | End: 2021-11-04 | Stop reason: HOSPADM

## 2021-11-02 RX ORDER — AMOXICILLIN 250 MG
1 CAPSULE ORAL
Status: DISCONTINUED | OUTPATIENT
Start: 2021-11-02 | End: 2021-11-04 | Stop reason: HOSPADM

## 2021-11-02 RX ORDER — BISACODYL 10 MG
10 SUPPOSITORY, RECTAL RECTAL
Status: DISCONTINUED | OUTPATIENT
Start: 2021-11-02 | End: 2021-11-04 | Stop reason: HOSPADM

## 2021-11-02 RX ORDER — HYDROMORPHONE HYDROCHLORIDE 1 MG/ML
0.1 INJECTION, SOLUTION INTRAMUSCULAR; INTRAVENOUS; SUBCUTANEOUS
Status: DISCONTINUED | OUTPATIENT
Start: 2021-11-02 | End: 2021-11-02 | Stop reason: HOSPADM

## 2021-11-02 RX ORDER — MIDAZOLAM HYDROCHLORIDE 1 MG/ML
1 INJECTION INTRAMUSCULAR; INTRAVENOUS
Status: DISCONTINUED | OUTPATIENT
Start: 2021-11-02 | End: 2021-11-02 | Stop reason: HOSPADM

## 2021-11-02 RX ORDER — POLYETHYLENE GLYCOL 3350 17 G/17G
1 POWDER, FOR SOLUTION ORAL 2 TIMES DAILY PRN
Status: DISCONTINUED | OUTPATIENT
Start: 2021-11-02 | End: 2021-11-04 | Stop reason: HOSPADM

## 2021-11-02 RX ORDER — PROMETHAZINE HYDROCHLORIDE 25 MG/1
12.5-25 SUPPOSITORY RECTAL EVERY 4 HOURS PRN
Status: DISCONTINUED | OUTPATIENT
Start: 2021-11-02 | End: 2021-11-04 | Stop reason: HOSPADM

## 2021-11-02 RX ORDER — OXYCODONE HCL 5 MG/5 ML
10 SOLUTION, ORAL ORAL
Status: COMPLETED | OUTPATIENT
Start: 2021-11-02 | End: 2021-11-02

## 2021-11-02 RX ORDER — CYCLOBENZAPRINE HCL 10 MG
10 TABLET ORAL EVERY 8 HOURS PRN
Status: DISCONTINUED | OUTPATIENT
Start: 2021-11-02 | End: 2021-11-04 | Stop reason: HOSPADM

## 2021-11-02 RX ORDER — ONDANSETRON 2 MG/ML
4 INJECTION INTRAMUSCULAR; INTRAVENOUS
Status: DISCONTINUED | OUTPATIENT
Start: 2021-11-02 | End: 2021-11-02 | Stop reason: HOSPADM

## 2021-11-02 RX ORDER — DIPHENHYDRAMINE HCL 25 MG
25 TABLET ORAL EVERY 6 HOURS PRN
Status: DISCONTINUED | OUTPATIENT
Start: 2021-11-02 | End: 2021-11-04 | Stop reason: HOSPADM

## 2021-11-02 RX ORDER — OXYCODONE HYDROCHLORIDE 10 MG/1
10 TABLET ORAL
Status: DISCONTINUED | OUTPATIENT
Start: 2021-11-02 | End: 2021-11-04 | Stop reason: HOSPADM

## 2021-11-02 RX ORDER — HYDROMORPHONE HYDROCHLORIDE 1 MG/ML
0.4 INJECTION, SOLUTION INTRAMUSCULAR; INTRAVENOUS; SUBCUTANEOUS
Status: DISCONTINUED | OUTPATIENT
Start: 2021-11-02 | End: 2021-11-02 | Stop reason: HOSPADM

## 2021-11-02 RX ORDER — ENEMA 19; 7 G/133ML; G/133ML
1 ENEMA RECTAL
Status: DISCONTINUED | OUTPATIENT
Start: 2021-11-02 | End: 2021-11-04 | Stop reason: HOSPADM

## 2021-11-02 RX ORDER — CEFAZOLIN SODIUM 2 G/100ML
2 INJECTION, SOLUTION INTRAVENOUS EVERY 8 HOURS
Status: COMPLETED | OUTPATIENT
Start: 2021-11-02 | End: 2021-11-02

## 2021-11-02 RX ORDER — BUPIVACAINE HYDROCHLORIDE AND EPINEPHRINE 5; 5 MG/ML; UG/ML
INJECTION, SOLUTION EPIDURAL; INTRACAUDAL; PERINEURAL
Status: DISCONTINUED | OUTPATIENT
Start: 2021-11-02 | End: 2021-11-02 | Stop reason: HOSPADM

## 2021-11-02 RX ORDER — DIAZEPAM 5 MG/1
5 TABLET ORAL EVERY 4 HOURS PRN
Status: DISCONTINUED | OUTPATIENT
Start: 2021-11-02 | End: 2021-11-04 | Stop reason: HOSPADM

## 2021-11-02 RX ORDER — HYDROMORPHONE HYDROCHLORIDE 1 MG/ML
0.2 INJECTION, SOLUTION INTRAMUSCULAR; INTRAVENOUS; SUBCUTANEOUS
Status: DISCONTINUED | OUTPATIENT
Start: 2021-11-02 | End: 2021-11-02 | Stop reason: HOSPADM

## 2021-11-02 RX ORDER — ACETAMINOPHEN 500 MG
1000 TABLET ORAL EVERY 6 HOURS PRN
Status: DISCONTINUED | OUTPATIENT
Start: 2021-11-07 | End: 2021-11-04 | Stop reason: HOSPADM

## 2021-11-02 RX ORDER — HALOPERIDOL 5 MG/ML
1 INJECTION INTRAMUSCULAR
Status: DISCONTINUED | OUTPATIENT
Start: 2021-11-02 | End: 2021-11-02 | Stop reason: HOSPADM

## 2021-11-02 RX ORDER — METHOCARBAMOL 750 MG/1
750 TABLET, FILM COATED ORAL EVERY 8 HOURS PRN
Status: DISCONTINUED | OUTPATIENT
Start: 2021-11-02 | End: 2021-11-04 | Stop reason: HOSPADM

## 2021-11-02 RX ORDER — SODIUM CHLORIDE, SODIUM LACTATE, POTASSIUM CHLORIDE, CALCIUM CHLORIDE 600; 310; 30; 20 MG/100ML; MG/100ML; MG/100ML; MG/100ML
INJECTION, SOLUTION INTRAVENOUS CONTINUOUS
Status: DISCONTINUED | OUTPATIENT
Start: 2021-11-02 | End: 2021-11-02 | Stop reason: HOSPADM

## 2021-11-02 RX ORDER — LIDOCAINE HYDROCHLORIDE 20 MG/ML
INJECTION, SOLUTION EPIDURAL; INFILTRATION; INTRACAUDAL; PERINEURAL PRN
Status: DISCONTINUED | OUTPATIENT
Start: 2021-11-02 | End: 2021-11-02 | Stop reason: SURG

## 2021-11-02 RX ORDER — DIPHENHYDRAMINE HYDROCHLORIDE 50 MG/ML
12.5 INJECTION INTRAMUSCULAR; INTRAVENOUS
Status: DISCONTINUED | OUTPATIENT
Start: 2021-11-02 | End: 2021-11-02 | Stop reason: HOSPADM

## 2021-11-02 RX ORDER — ACETAMINOPHEN 500 MG
1000 TABLET ORAL EVERY 6 HOURS
Status: DISCONTINUED | OUTPATIENT
Start: 2021-11-02 | End: 2021-11-04 | Stop reason: HOSPADM

## 2021-11-02 RX ORDER — MEPERIDINE HYDROCHLORIDE 25 MG/ML
12.5 INJECTION INTRAMUSCULAR; INTRAVENOUS; SUBCUTANEOUS
Status: DISCONTINUED | OUTPATIENT
Start: 2021-11-02 | End: 2021-11-02 | Stop reason: HOSPADM

## 2021-11-02 RX ORDER — ONDANSETRON 4 MG/1
4 TABLET, ORALLY DISINTEGRATING ORAL EVERY 4 HOURS PRN
Status: DISCONTINUED | OUTPATIENT
Start: 2021-11-02 | End: 2021-11-04 | Stop reason: HOSPADM

## 2021-11-02 RX ORDER — PROCHLORPERAZINE EDISYLATE 5 MG/ML
5-10 INJECTION INTRAMUSCULAR; INTRAVENOUS EVERY 4 HOURS PRN
Status: DISCONTINUED | OUTPATIENT
Start: 2021-11-02 | End: 2021-11-04 | Stop reason: HOSPADM

## 2021-11-02 RX ORDER — LOSARTAN POTASSIUM 50 MG/1
100 TABLET ORAL
Status: DISCONTINUED | OUTPATIENT
Start: 2021-11-03 | End: 2021-11-04 | Stop reason: HOSPADM

## 2021-11-02 RX ORDER — DEXAMETHASONE SODIUM PHOSPHATE 4 MG/ML
INJECTION, SOLUTION INTRA-ARTICULAR; INTRALESIONAL; INTRAMUSCULAR; INTRAVENOUS; SOFT TISSUE PRN
Status: DISCONTINUED | OUTPATIENT
Start: 2021-11-02 | End: 2021-11-02 | Stop reason: SURG

## 2021-11-02 RX ORDER — SODIUM CHLORIDE, SODIUM LACTATE, POTASSIUM CHLORIDE, CALCIUM CHLORIDE 600; 310; 30; 20 MG/100ML; MG/100ML; MG/100ML; MG/100ML
INJECTION, SOLUTION INTRAVENOUS CONTINUOUS
Status: ACTIVE | OUTPATIENT
Start: 2021-11-02 | End: 2021-11-02

## 2021-11-02 RX ORDER — DIPHENHYDRAMINE HYDROCHLORIDE 50 MG/ML
25 INJECTION INTRAMUSCULAR; INTRAVENOUS EVERY 6 HOURS PRN
Status: DISCONTINUED | OUTPATIENT
Start: 2021-11-02 | End: 2021-11-04 | Stop reason: HOSPADM

## 2021-11-02 RX ORDER — ROCURONIUM BROMIDE 10 MG/ML
INJECTION, SOLUTION INTRAVENOUS PRN
Status: DISCONTINUED | OUTPATIENT
Start: 2021-11-02 | End: 2021-11-02 | Stop reason: SURG

## 2021-11-02 RX ORDER — CALCIUM CARBONATE 500 MG/1
500 TABLET, CHEWABLE ORAL 2 TIMES DAILY
Status: DISCONTINUED | OUTPATIENT
Start: 2021-11-02 | End: 2021-11-04 | Stop reason: HOSPADM

## 2021-11-02 RX ORDER — AMOXICILLIN 250 MG
1 CAPSULE ORAL NIGHTLY
Status: DISCONTINUED | OUTPATIENT
Start: 2021-11-02 | End: 2021-11-04 | Stop reason: HOSPADM

## 2021-11-02 RX ORDER — HYDROMORPHONE HYDROCHLORIDE 1 MG/ML
0.5 INJECTION, SOLUTION INTRAMUSCULAR; INTRAVENOUS; SUBCUTANEOUS
Status: DISCONTINUED | OUTPATIENT
Start: 2021-11-02 | End: 2021-11-04 | Stop reason: HOSPADM

## 2021-11-02 RX ORDER — OXYCODONE HCL 5 MG/5 ML
5 SOLUTION, ORAL ORAL
Status: COMPLETED | OUTPATIENT
Start: 2021-11-02 | End: 2021-11-02

## 2021-11-02 RX ORDER — HYDRALAZINE HYDROCHLORIDE 20 MG/ML
5 INJECTION INTRAMUSCULAR; INTRAVENOUS
Status: DISCONTINUED | OUTPATIENT
Start: 2021-11-02 | End: 2021-11-02 | Stop reason: HOSPADM

## 2021-11-02 RX ORDER — LOSARTAN POTASSIUM AND HYDROCHLOROTHIAZIDE 25; 100 MG/1; MG/1
1 TABLET ORAL DAILY
Status: DISCONTINUED | OUTPATIENT
Start: 2021-11-03 | End: 2021-11-02

## 2021-11-02 RX ORDER — ONDANSETRON 2 MG/ML
4 INJECTION INTRAMUSCULAR; INTRAVENOUS EVERY 4 HOURS PRN
Status: DISCONTINUED | OUTPATIENT
Start: 2021-11-02 | End: 2021-11-04 | Stop reason: HOSPADM

## 2021-11-02 RX ORDER — LABETALOL HYDROCHLORIDE 5 MG/ML
5 INJECTION, SOLUTION INTRAVENOUS
Status: DISCONTINUED | OUTPATIENT
Start: 2021-11-02 | End: 2021-11-02 | Stop reason: HOSPADM

## 2021-11-02 RX ORDER — ALPRAZOLAM 0.25 MG/1
0.25 TABLET ORAL
Status: DISCONTINUED | OUTPATIENT
Start: 2021-11-02 | End: 2021-11-04 | Stop reason: HOSPADM

## 2021-11-02 RX ORDER — OXYCODONE HYDROCHLORIDE 5 MG/1
5 TABLET ORAL
Status: DISCONTINUED | OUTPATIENT
Start: 2021-11-02 | End: 2021-11-04 | Stop reason: HOSPADM

## 2021-11-02 RX ORDER — DOCUSATE SODIUM 100 MG/1
100 CAPSULE, LIQUID FILLED ORAL 2 TIMES DAILY
Status: DISCONTINUED | OUTPATIENT
Start: 2021-11-02 | End: 2021-11-04 | Stop reason: HOSPADM

## 2021-11-02 RX ORDER — PROMETHAZINE HYDROCHLORIDE 25 MG/1
12.5-25 TABLET ORAL EVERY 4 HOURS PRN
Status: DISCONTINUED | OUTPATIENT
Start: 2021-11-02 | End: 2021-11-04 | Stop reason: HOSPADM

## 2021-11-02 RX ORDER — CEFAZOLIN SODIUM 1 G/3ML
INJECTION, POWDER, FOR SOLUTION INTRAMUSCULAR; INTRAVENOUS PRN
Status: DISCONTINUED | OUTPATIENT
Start: 2021-11-02 | End: 2021-11-02 | Stop reason: SURG

## 2021-11-02 RX ORDER — DEXTROSE MONOHYDRATE, SODIUM CHLORIDE, AND POTASSIUM CHLORIDE 50; 1.49; 9 G/1000ML; G/1000ML; G/1000ML
INJECTION, SOLUTION INTRAVENOUS CONTINUOUS
Status: DISCONTINUED | OUTPATIENT
Start: 2021-11-02 | End: 2021-11-04 | Stop reason: HOSPADM

## 2021-11-02 RX ADMIN — FENTANYL CITRATE 75 MCG: 50 INJECTION, SOLUTION INTRAMUSCULAR; INTRAVENOUS at 10:54

## 2021-11-02 RX ADMIN — SENNOSIDES AND DOCUSATE SODIUM 1 TABLET: 8.6; 5 TABLET ORAL at 21:56

## 2021-11-02 RX ADMIN — DOCUSATE SODIUM 100 MG: 100 CAPSULE, LIQUID FILLED ORAL at 17:03

## 2021-11-02 RX ADMIN — HYDROMORPHONE HYDROCHLORIDE 0.4 MG: 1 INJECTION, SOLUTION INTRAMUSCULAR; INTRAVENOUS; SUBCUTANEOUS at 13:50

## 2021-11-02 RX ADMIN — NITROGLYCERIN 1 INCH: 20 OINTMENT TOPICAL at 11:21

## 2021-11-02 RX ADMIN — FENTANYL CITRATE 25 MCG: 50 INJECTION, SOLUTION INTRAMUSCULAR; INTRAVENOUS at 12:37

## 2021-11-02 RX ADMIN — OXYCODONE HYDROCHLORIDE 10 MG: 10 TABLET ORAL at 21:56

## 2021-11-02 RX ADMIN — FENTANYL CITRATE 50 MCG: 50 INJECTION, SOLUTION INTRAMUSCULAR; INTRAVENOUS at 13:34

## 2021-11-02 RX ADMIN — POTASSIUM CHLORIDE, DEXTROSE MONOHYDRATE AND SODIUM CHLORIDE: 150; 5; 900 INJECTION, SOLUTION INTRAVENOUS at 15:03

## 2021-11-02 RX ADMIN — HYDROMORPHONE HYDROCHLORIDE 0.2 MG: 1 INJECTION, SOLUTION INTRAMUSCULAR; INTRAVENOUS; SUBCUTANEOUS at 13:55

## 2021-11-02 RX ADMIN — FENTANYL CITRATE 25 MCG: 50 INJECTION, SOLUTION INTRAMUSCULAR; INTRAVENOUS at 12:35

## 2021-11-02 RX ADMIN — CEFAZOLIN SODIUM 2 G: 2 INJECTION, SOLUTION INTRAVENOUS at 15:03

## 2021-11-02 RX ADMIN — PROPOFOL 200 MG: 10 INJECTION, EMULSION INTRAVENOUS at 10:48

## 2021-11-02 RX ADMIN — SUGAMMADEX 200 MG: 100 INJECTION, SOLUTION INTRAVENOUS at 12:14

## 2021-11-02 RX ADMIN — ACETAMINOPHEN 1000 MG: 500 TABLET ORAL at 17:03

## 2021-11-02 RX ADMIN — ROCURONIUM BROMIDE 20 MG: 10 INJECTION, SOLUTION INTRAVENOUS at 11:50

## 2021-11-02 RX ADMIN — ROCURONIUM BROMIDE 50 MG: 10 INJECTION, SOLUTION INTRAVENOUS at 10:48

## 2021-11-02 RX ADMIN — SODIUM CHLORIDE, POTASSIUM CHLORIDE, SODIUM LACTATE AND CALCIUM CHLORIDE: 600; 310; 30; 20 INJECTION, SOLUTION INTRAVENOUS at 10:17

## 2021-11-02 RX ADMIN — FENTANYL CITRATE 100 MCG: 50 INJECTION, SOLUTION INTRAMUSCULAR; INTRAVENOUS at 10:48

## 2021-11-02 RX ADMIN — OXYCODONE HYDROCHLORIDE 10 MG: 5 SOLUTION ORAL at 12:33

## 2021-11-02 RX ADMIN — CALCIUM CARBONATE 500 MG: 500 TABLET, CHEWABLE ORAL at 17:03

## 2021-11-02 RX ADMIN — DEXAMETHASONE SODIUM PHOSPHATE 8 MG: 4 INJECTION, SOLUTION INTRA-ARTICULAR; INTRALESIONAL; INTRAMUSCULAR; INTRAVENOUS; SOFT TISSUE at 11:06

## 2021-11-02 RX ADMIN — CEFAZOLIN 3 G: 330 INJECTION, POWDER, FOR SOLUTION INTRAMUSCULAR; INTRAVENOUS at 10:56

## 2021-11-02 RX ADMIN — HYDROMORPHONE HYDROCHLORIDE 0.4 MG: 1 INJECTION, SOLUTION INTRAMUSCULAR; INTRAVENOUS; SUBCUTANEOUS at 13:45

## 2021-11-02 RX ADMIN — ACETAMINOPHEN 1000 MG: 500 TABLET ORAL at 22:54

## 2021-11-02 RX ADMIN — LIDOCAINE HYDROCHLORIDE 60 MG: 20 INJECTION, SOLUTION EPIDURAL; INFILTRATION; INTRACAUDAL at 10:48

## 2021-11-02 RX ADMIN — CEFAZOLIN SODIUM 2 G: 2 INJECTION, SOLUTION INTRAVENOUS at 22:54

## 2021-11-02 RX ADMIN — OXYCODONE HYDROCHLORIDE 10 MG: 10 TABLET ORAL at 17:03

## 2021-11-02 ASSESSMENT — PAIN DESCRIPTION - PAIN TYPE
TYPE: ACUTE PAIN;CHRONIC PAIN;SURGICAL PAIN
TYPE: ACUTE PAIN;SURGICAL PAIN
TYPE: ACUTE PAIN;SURGICAL PAIN
TYPE: ACUTE PAIN;CHRONIC PAIN;SURGICAL PAIN
TYPE: ACUTE PAIN;CHRONIC PAIN;SURGICAL PAIN
TYPE: ACUTE PAIN;SURGICAL PAIN
TYPE: ACUTE PAIN;CHRONIC PAIN;SURGICAL PAIN

## 2021-11-02 ASSESSMENT — PATIENT HEALTH QUESTIONNAIRE - PHQ9
SUM OF ALL RESPONSES TO PHQ9 QUESTIONS 1 AND 2: 0
2. FEELING DOWN, DEPRESSED, IRRITABLE, OR HOPELESS: NOT AT ALL
1. LITTLE INTEREST OR PLEASURE IN DOING THINGS: NOT AT ALL

## 2021-11-02 ASSESSMENT — PAIN SCALES - GENERAL: PAIN_LEVEL: 3

## 2021-11-02 NOTE — ANESTHESIA TIME REPORT
Anesthesia Start and Stop Event Times     Date Time Event    11/2/2021 0934 Ready for Procedure     1043 Anesthesia Start     1225 Anesthesia Stop        Responsible Staff  11/02/21    Name Role Begin End    Fabian Brown M.D. Anesth 1043 1225        Preop Diagnosis (Free Text):  Pre-op Diagnosis     SPINAL STENOSIS        Preop Diagnosis (Codes):    Premium Reason  Non-Premium    Comments:

## 2021-11-02 NOTE — OR NURSING
Pt's wife Johanna phoned and updated on pt status in Recovery.  Johanna is waiting here at the hospital.

## 2021-11-02 NOTE — OP REPORT
Surgeon Lon Pichardo  First assist Stephie Borges    Preoperative diagnosis is lumbar stenosis with neurogenic claudication  Postoperative diagnosis same    Procedures  1.  L3, L4, L5 complete laminectomy  2.  L3-4, L4-5, L5-S1 bilateral medial facetectomy  3.  L3-4, L4-5, L5-S1 bilateral foraminotomies    Complications none  Blood loss less than 200 cc  Drains medium Hemovac    Brief HPI this is a 60-year-old gentleman who presented to neurosurgery clinic with lateral recess stenosis and symptoms of neurogenic claudication with right radiculopathy I we attempted significant amounts of interventions for the conservative management including physical therapy weight loss transforaminal epidural injections none of which gave him symptomatic relief for any duration of time he wished to move forward with surgical intervention for decompression of his lumbar stenosis.    Consent was obtained for the patient and his wife present risk complications occasion of the surgery which included but not limited to CSF leak nerve root injury or need for more surgery agreed and consented.    Procedure in detail  Patient is brought to the operating room and general anesthesia with endotracheal intubation was then placed on a Elpidio table with a Augustus frame he was then clipped prepped and draped in sterile usual fashion for a lower lumbar surgery.  A surgical timeout was then performed confirm the correct side site procedure patient with all faculty and staff agreeing.    We then palpated over his sacrum to assume the L5-S1 disc interspace and then made a midline incision using a tail 10 blade through scalpel after infiltrating with Marcaine with epinephrine we then dissected the avascular plane down to the fascia identify the thoracolumbar fascia and then cut along the spinous process of L3-L4L5 and then an avascular plane dissected along the lamina and pars of the L3-L4-L5 and S1 vertebra.  We then placed a pair of Kevin  retractors into the wound and then obtained a fluoroscopy lateral shot to confirm that we are between the L4-5 disc interspace on the interspinous ligament.  We then remove theWith the Leksell rongeur lamina and spinous processes much possible we then used a 5 mm round ball bur to remove the residual of the lamina and medial facets and then with using of the Kerrisons we remove the residual of the lamina at L3-L4L5 and then the medial facets to remove the lateral recess stenosis at L3-4, L4-5, and L5-S1.  We then FloSeal and the posterior lateral gutters to achieve hemostasis and then used a Driscoll ball-tipped probe to probe the foramina bilaterally at L3-4, L4-5, L5-S1 and completed foraminotomies using a combination of the curved and straight Kerrison punches until a Driscoll ball-tipped probe could fit out without any resistance.    We then checked for adequate hemostasis and then placed a medium Hemovac in the epidural space and closed the wound in layers using 0 Vicryl in the fascia 2-0 Vicryl in the dermis and surgical staples with a silver dressing to complete the closure of the wound.    My physician assistant was necessary she assisted with protecting the thecal elements opening closure and keeping the field dry for surgical decompression.    Lon Pichardo MD

## 2021-11-02 NOTE — ANESTHESIA POSTPROCEDURE EVALUATION
Patient: Alan Prabhakar Jr.    Procedure Summary     Date: 11/02/21 Room / Location: [facility] 05 / SURGERY [facility]    Anesthesia Start: 1043 Anesthesia Stop: 1225    Procedures:       LAMINECTOMY, SPINE, LUMBAR, WITH DISCECTOMY - L3-5 WITH MEDIAL FACETECTOMY (Back)      FORAMINOTOMY, SPINE (Back) Diagnosis: (SPINAL STENOSIS)    Surgeons: Lon Pichardo M.D. Responsible Provider: Fabian Brown M.D.    Anesthesia Type: general ASA Status: 3          Final Anesthesia Type: general  Last vitals  BP   Blood Pressure: 136/82    Temp   36.2 °C (97.2 °F)    Pulse   69   Resp   20    SpO2   96 %      Anesthesia Post Evaluation    Patient location during evaluation: PACU  Patient participation: complete - patient participated  Level of consciousness: awake and alert  Pain score: 3    Airway patency: patent  Anesthetic complications: no  Cardiovascular status: hemodynamically stable  Respiratory status: acceptable  Hydration status: euvolemic    PONV: none          No complications documented.     Nurse Pain Score: 7 (NPRS)

## 2021-11-02 NOTE — ANESTHESIA PREPROCEDURE EVALUATION
Relevant Problems   PULMONARY   (positive) Asthma   (positive) COPD (chronic obstructive pulmonary disease) (HCC)      CARDIAC   (positive) Hypertension      GI   (positive) Gastroesophageal reflux disease without esophagitis   (positive) Hiatal hernia         (positive) Nephrolithiasis       Physical Exam    Airway   Mallampati: II  TM distance: >3 FB  Neck ROM: full       Cardiovascular - normal exam  Rhythm: regular  Rate: normal  (-) murmur     Dental - normal exam           Pulmonary - normal exam  (+) wheezes     Abdominal   (+) obese     Neurological - normal exam                 Anesthesia Plan    ASA 3   ASA physical status 3 criteria: COPD, hypertension - poorly controlled and morbid obesity - BMI greater than or equal to 40    Plan - general       Airway plan will be ETT          Induction: intravenous    Postoperative Plan: Postoperative administration of opioids is intended.    Pertinent diagnostic labs and testing reviewed    Informed Consent:    Anesthetic plan and risks discussed with patient.    Use of blood products discussed with: patient whom consented to blood products.

## 2021-11-02 NOTE — ANESTHESIA PROCEDURE NOTES
Airway    Date/Time: 11/2/2021 10:49 AM  Performed by: Fabian Brown M.D.  Authorized by: Fabian Brown M.D.     Location:  OR  Urgency:  Elective  Difficult Airway: No    Indications for Airway Management:  Anesthesia      Spontaneous Ventilation: absent    Sedation Level:  Deep  Preoxygenated: Yes    Patient Position:  Sniffing  Final Airway Type:  Endotracheal airway  Final Endotracheal Airway:  ETT  Cuffed: Yes    Technique Used for Successful ETT Placement:  Direct laryngoscopy    Insertion Site:  Oral  Blade Type:  Jana  Laryngoscope Blade/Videolaryngoscope Blade Size:  3  ETT Size (mm):  8.0  Measured from:  Lips  ETT to Lips (cm):  24  Placement Verified by: auscultation and capnometry    Cormack-Lehane Classification:  Grade IIb - view of arytenoids or posterior of glottis only  Number of Attempts at Approach:  1  Number of Other Approaches Attempted:  0

## 2021-11-02 NOTE — CARE PLAN
The patient is Watcher - Medium risk of patient condition declining or worsening    Shift Goals  Clinical Goals: mobilize, pain control  Patient Goals: eat, rest    Progress made toward(s) clinical / shift goals:    Problem: Lumbar/Thoracic Spine Surgery  Goal: Post-Operative Lumbar/Thoracic Spine Surgery: Patient will achieve optimal post-surgical outcomes  Outcome: Progressing     Problem: Neuro Status  Goal: Neuro status will remain stable or improve  Outcome: Progressing     Problem: Knowledge Deficit - Neuro Surgical  Goal: Patient's understanding of spinal precautions, appropriate body mechanics and incision care will improve  Outcome: Progressing     Problem: Respiratory/Oxygenation Function Post-Surgical  Goal: Patient will achieve/maintain normal respiratory rate/effort  Outcome: Progressing     Problem: Early Mobilization - Post Surgery  Goal: Early mobilization post surgery  Outcome: Progressing     Problem: Pain - Post Surgery  Goal: Alleviation or reduction of pain post surgery  Outcome: Progressing     Problem: Surgical Drain Management  Goal: Proper management/care of surgical drains will be maintained  Outcome: Progressing       Patient is not progressing towards the following goals:

## 2021-11-03 ENCOUNTER — PATIENT OUTREACH (OUTPATIENT)
Dept: HEALTH INFORMATION MANAGEMENT | Facility: OTHER | Age: 61
End: 2021-11-03

## 2021-11-03 PROCEDURE — 97535 SELF CARE MNGMENT TRAINING: CPT

## 2021-11-03 PROCEDURE — A9270 NON-COVERED ITEM OR SERVICE: HCPCS | Performed by: PHYSICIAN ASSISTANT

## 2021-11-03 PROCEDURE — 700102 HCHG RX REV CODE 250 W/ 637 OVERRIDE(OP): Performed by: PHYSICIAN ASSISTANT

## 2021-11-03 PROCEDURE — 97162 PT EVAL MOD COMPLEX 30 MIN: CPT

## 2021-11-03 PROCEDURE — G0378 HOSPITAL OBSERVATION PER HR: HCPCS

## 2021-11-03 RX ORDER — CYCLOBENZAPRINE HCL 10 MG
10 TABLET ORAL 3 TIMES DAILY PRN
Qty: 30 TABLET | Refills: 0
Start: 2021-11-03 | End: 2023-02-25

## 2021-11-03 RX ORDER — OXYCODONE HYDROCHLORIDE AND ACETAMINOPHEN 5; 325 MG/1; MG/1
1 TABLET ORAL EVERY 4 HOURS PRN
Qty: 15 TABLET | Refills: 0
Start: 2021-11-03 | End: 2021-11-10

## 2021-11-03 RX ADMIN — OXYCODONE HYDROCHLORIDE 10 MG: 10 TABLET ORAL at 20:49

## 2021-11-03 RX ADMIN — OXYCODONE HYDROCHLORIDE 10 MG: 10 TABLET ORAL at 14:37

## 2021-11-03 RX ADMIN — METOPROLOL TARTRATE 25 MG: 25 TABLET, FILM COATED ORAL at 06:13

## 2021-11-03 RX ADMIN — LOSARTAN POTASSIUM 100 MG: 50 TABLET, FILM COATED ORAL at 06:12

## 2021-11-03 RX ADMIN — METOPROLOL TARTRATE 25 MG: 25 TABLET, FILM COATED ORAL at 17:50

## 2021-11-03 RX ADMIN — OXYCODONE HYDROCHLORIDE 10 MG: 10 TABLET ORAL at 07:36

## 2021-11-03 RX ADMIN — CALCIUM CARBONATE 500 MG: 500 TABLET, CHEWABLE ORAL at 06:13

## 2021-11-03 RX ADMIN — ACETAMINOPHEN 1000 MG: 500 TABLET ORAL at 06:13

## 2021-11-03 RX ADMIN — OXYCODONE HYDROCHLORIDE 10 MG: 10 TABLET ORAL at 11:39

## 2021-11-03 RX ADMIN — ACETAMINOPHEN 1000 MG: 500 TABLET ORAL at 17:45

## 2021-11-03 RX ADMIN — DOCUSATE SODIUM 100 MG: 100 CAPSULE, LIQUID FILLED ORAL at 17:46

## 2021-11-03 RX ADMIN — DOCUSATE SODIUM 100 MG: 100 CAPSULE, LIQUID FILLED ORAL at 06:12

## 2021-11-03 RX ADMIN — ACETAMINOPHEN 1000 MG: 500 TABLET ORAL at 23:48

## 2021-11-03 RX ADMIN — METHOCARBAMOL 750 MG: 750 TABLET ORAL at 23:48

## 2021-11-03 RX ADMIN — ACETAMINOPHEN 1000 MG: 500 TABLET ORAL at 11:38

## 2021-11-03 RX ADMIN — HYDROCHLOROTHIAZIDE 25 MG: 25 TABLET ORAL at 06:12

## 2021-11-03 RX ADMIN — OXYCODONE HYDROCHLORIDE 10 MG: 10 TABLET ORAL at 17:44

## 2021-11-03 RX ADMIN — CALCIUM CARBONATE 500 MG: 500 TABLET, CHEWABLE ORAL at 17:47

## 2021-11-03 ASSESSMENT — COGNITIVE AND FUNCTIONAL STATUS - GENERAL
MOVING FROM LYING ON BACK TO SITTING ON SIDE OF FLAT BED: A LITTLE
SUGGESTED CMS G CODE MODIFIER MOBILITY: CJ
TURNING FROM BACK TO SIDE WHILE IN FLAT BAD: A LITTLE
MOBILITY SCORE: 21
MOVING TO AND FROM BED TO CHAIR: A LITTLE

## 2021-11-03 ASSESSMENT — PAIN DESCRIPTION - PAIN TYPE
TYPE: SURGICAL PAIN
TYPE: ACUTE PAIN
TYPE: SURGICAL PAIN
TYPE: ACUTE PAIN;SURGICAL PAIN
TYPE: SURGICAL PAIN

## 2021-11-03 ASSESSMENT — GAIT ASSESSMENTS
DEVIATION: NO DEVIATION
DISTANCE (FEET): 250

## 2021-11-03 NOTE — THERAPY
Physical Therapy   Initial Evaluation     Patient Name: Alan Prabhakar Jr.  Age:  60 y.o., Sex:  male  Medical Record #: [id number]  Today's Date: 11/3/2021     Precautions  Precautions: Spinal / Back Precautions   Comments: Hemovac, No brace orders    Assessment  Patient is 60 y.o. male POD1 L3-5 laminectomy for R posterior-lateral LE radicular pain.  He lives in a 3SH w/ 2STE w/ his wife and 27yo dtr, and reports that he plans to stay on the 1st floor when he returns home w/ his wife and dtr both available to assist as needed.  The pt was previously independent w/ functional mobility tasks, and ambulating community distance w/ no AD.  Currently, he is able to demo bed mobility spv w/ hob flat and no bed rails w/ VCx1 for log roll.  He is able to perform STS eob w/ no AD SBA.  Upon initiation of gait, pt c/o LLE groin and posterior thigh tingling that was unchanging throughout.  He demo'd gait distance 250' using no AD SBA w/ no deviations of LOB observed, distance limited by therapist. The pt also demo'd ability to navigate 3 stairs w/ uni UE support on railing SBA.  Provided pt edu regarding spine precautions and exercise recommendations w/ adequate carryover demo'd.  Recommend pt dc home w/ OP PT f/u given current level of independence demo'd w/ functional mobility tasks, and extent of support available at home.  Will not follow, please re consult should there be a change in the pt's condition.        Plan    Recommend Physical Therapy for Evaluation only    DC Equipment Recommendations: None  Discharge Recommendations: Recommend outpatient physical therapy services to address higher level deficits       Subjective/Objective       11/03/21 1021   Prior Living Situation   Prior Services Home-Independent   Housing / Facility 3 Story House   Steps Into Home 2   Steps In Home   (FOSx2)   Equipment Owned None   Lives with - Patient's Self Care Capacity Spouse;Adult Children   Comments Pt plans to stay on 1st  floor at home, wife and 27yo dtr both available to assist   Prior Level of Functional Mobility   Bed Mobility Independent   Transfer Status Independent   Ambulation Independent   Distance Ambulation (Feet)   (community distances)   Assistive Devices Used None   Stairs Independent   History of Falls   History of Falls No   Cognition    Cognition / Consciousness WDL   Level of Consciousness Alert   Comments pt pleasant and cooperative   Passive ROM Lower Body   Passive ROM Lower Body WDL   Active ROM Lower Body    Active ROM Lower Body  WDL   Comments observed during functional mobility   Strength Lower Body   Lower Body Strength  WDL   Comments as above   Sensation Lower Body   Lower Extremity Sensation   X   Comments pt reporting tingling in LLE groin/posterior thigh region during ambulation, sensation intact to LT BLE   Coordination Lower Body    Coordination Lower Body  WDL   Balance Assessment   Sitting Balance (Static) Good   Sitting Balance (Dynamic) Good   Standing Balance (Static) Fair +   Standing Balance (Dynamic) Fair +   Weight Shift Sitting Good   Weight Shift Standing Good   Comments stand w/ no AD   Gait Analysis   Gait Level Of Assist   (SBA)   Assistive Device None   Distance (Feet) 250   # of Times Distance was Traveled 1   Deviation No deviation   # of Stairs Climbed 3   Level of Assist with Stairs   (SBA)   Weight Bearing Status no restrictions   Comments distance limited by therapist   Bed Mobility    Supine to Sit Supervised   Sit to Supine Supervised   Scooting Supervised   Rolling Supervised   Comments hob flat, no rails, VCx1 for log roll   Functional Mobility   Sit to Stand   (SBA)   Bed, Chair, Wheelchair Transfer   (SBA)   Transfer Method Stand Step   Mobility STS eob w/ no AD   Activity Tolerance   Sitting Edge of Bed 15min   Standing 15min   Edema / Skin Assessment   Edema / Skin  X   Comments hemovac sealed, in place   Education Group   Education Provided Role of Physical  Therapist;Spine Precautions   Spine Precautions Patient Response Patient;Acceptance;Explanation;Demonstration;Action Demonstration;Handout   Role of Physical Therapist Patient Response Patient;Acceptance;Explanation;Demonstration;Action Demonstration   Additional Comments pt receptive of edu provided   Problem List    Problems Pain;Decreased Activity Tolerance   Session Information   Date / Session Number  11/3- eval only

## 2021-11-03 NOTE — CARE PLAN
Problem: Knowledge Deficit - Standard  Goal: Patient and family/care givers will demonstrate understanding of plan of care, disease process/condition, diagnostic tests and medications  Outcome: Progressing     Problem: Fall Risk  Goal: Patient will remain free from falls  Outcome: Progressing     The patient is Stable - Low risk of patient condition declining or worsening    Shift Goals  Clinical Goals: pain control  Patient Goals: rest    Progress made toward(s) clinical / shift goals:  POC discussed with pt at bedside. Pt asks appropriate questions, no additional concerns at this time.  Bed is locked and in lowest position, treaded socks on, bed alarm on, DME out of sight, call light and belongings within reach, pt calls appropriately for assistance, hourly rounding in place.      Patient is not progressing towards the following goals:

## 2021-11-03 NOTE — CARE PLAN
The patient is Stable - Low risk of patient condition declining or worsening    Shift Goals  Clinical Goals: pain management; mobility  Patient Goals: pain control, rest    Progress made toward(s) clinical / shift goals:    Problem: Fall Risk  Goal: Patient will remain free from falls  Outcome: Progressing  Note: Safety precautions are in place including bed locked and in lowest position, upper bed rails up, bed alarm on, call light within reach, treaded socks on, tray table and personal belongings within reach.        Patient is not progressing towards the following goals:

## 2021-11-03 NOTE — DISCHARGE SUMMARY
Discharge Summary    CHIEF COMPLAINT ON ADMISSION  No chief complaint on file.      Reason for Admission  SPINAL STENOSIS     Admission Date  11/2/2021    CODE STATUS  Full Code    HPI & HOSPITAL COURSE  This is a 60 y.o. male here with lumbar radiculopathy. Pt presented for elective spine surgery, which proceeded without complicaiton. Postop he recovered well & will be discharged when meeting criteria, cleared by PT/OT   No notes on file    Therefore, he is discharged in good and stable condition to home with close outpatient follow-up.    The patient recovered much more quickly than anticipated on admission.    Discharge Date   11/3/21 if meeting criteria    FOLLOW UP ITEMS POST DISCHARGE  [place] scheduled [place] at [facility] [place]    DISCHARGE DIAGNOSES  Active Problems:    * No active hospital problems. *  Resolved Problems:    * No resolved hospital problems. *      FOLLOW UP  No future appointments.  No follow-up provider specified.    MEDICATIONS ON DISCHARGE     Medication List      START taking these medications      Instructions   cyclobenzaprine 10 mg Tabs  Commonly known as: Flexeril   Take 1 Tablet by mouth 3 times a day as needed.  Dose: 10 mg     oxyCODONE-acetaminophen 5-325 MG Tabs  Commonly known as: PERCOCET   Take 1 Tablet by mouth every four hours as needed for up to 7 days.  Dose: 1 Tablet        CHANGE how you take these medications      Instructions   fluticasone 50 MCG/ACT nasal spray  What changed: when to take this  Commonly known as: FLONASE   SPRAY 1 SPRAY IN EACH NOSTRIL EVERY DAY        CONTINUE taking these medications      Instructions   ALPRAZolam 0.25 MG Tabs  Commonly known as: XANAX   Take 1 Tab by mouth 1 time daily as needed for Anxiety.  Dose: 0.25 mg     losartan-hydrochlorothiazide 100-25 MG per tablet  Commonly known as: HYZAAR   Take 1 Tablet by mouth every day.  Dose: 1 Tablet     metoprolol tartrate 25 MG Tabs  Commonly known as: LOPRESSOR   Take 25 mg by mouth 2 times a day.  Dose: 25  "mg        STOP taking these medications    KLS Diclofenac Sodium 1 % Gel  Generic drug: diclofenac sodium          Rx for percocet 5/325mg 1 tab q4-6hr prn x7d #42 NR & flexeril 10mg TID x14d were e-scribed to [facility] [address] in [place]   reviewed, low risk per Ort, informed consent obtained    Allergies  Allergies   Allergen Reactions   • Codeine Hives and Nausea   • Hydrocodone Itching   • Pcn [Penicillins] Unspecified     Pt states \"It's a childhood allergies\".        DIET  Orders Placed This Encounter   Procedures   • Diet Order Diet: Regular     Standing Status:   Standing     Number of Occurrences:   1     Order Specific Question:   Diet:     Answer:   Regular [1]       ACTIVITY  Light duty.  Weight bearing as tolerated    CONSULTATIONS       PROCEDURES   L3-5 laminectomy, L3-S1 bilateral medial facetectomy & bilateral foraminotomies  Dr Lon Pichardo, 11/2/21    LABORATORY  Lab Results   Component Value Date    SODIUM 138 10/26/2021    POTASSIUM 4.0 10/26/2021    CHLORIDE 102 10/26/2021    CO2 25 10/26/2021    GLUCOSE 111 (H) 10/26/2021    BUN 17 10/26/2021    CREATININE 0.84 10/26/2021    CREATININE 1.0 04/27/2009        Lab Results   Component Value Date    WBC 9.6 10/26/2021    HEMOGLOBIN 17.4 10/26/2021    HEMATOCRIT 51.2 10/26/2021    PLATELETCT 296 10/26/2021        Total time of the discharge process exceeds 30 minutes.  "

## 2021-11-03 NOTE — PROGRESS NOTES
Neurosurgery Progress Note    Subjective:  Mild to moderate incisional pain, controlled on POs  Some persistent radicular right leg pain  Minimal mobilization    Exam:  A&O  FS x4  Dressing CDI  hvac 90/8hr, mixed bloody/serous    BP  Min: 103/54  Max: 166/89  Pulse  Av.4  Min: 60  Max: 92  Resp  Av.7  Min: 12  Max: 26  Temp  Av.3 °C (97.3 °F)  Min: 36 °C (96.8 °F)  Max: 36.5 °C (97.7 °F)  SpO2  Av.8 %  Min: 90 %  Max: 98 %    No data recorded                      Intake/Output                       21 - 2159 21 - 2159      Total  Total                 Intake    P.O.  315  -- 315  --  -- --    P.O. 315 -- 315 -- -- --    I.V.  1220  -- 1220  --  -- --    Propofol Volume 20 -- 20 -- -- --    Volume (mL) (lactated ringers infusion) 1200 -- 1200 -- -- --    Total Intake 1535 -- 1535 -- -- --       Output    Urine  --  -- --  --  -- --    Number of Times Voided -- 1 x 1 x 1 x -- 1 x    Emesis  --  0 0  --  -- --    Emesis -- 0 0 -- -- --    Drains  --  240 240  --  -- --    Output (mL) (Closed/Suction Drain 1 Back 10 Fr.) -- 240 240 -- -- --    Stool  --  -- --  --  -- --    Number of Times Stooled -- 0 x 0 x -- -- --    Blood  25  -- 25  --  -- --    Est. Blood Loss 25 -- 25 -- -- --    Total Output 25 240 265 -- -- --       Net I/O     1510 -240 1270 -- -- --            Intake/Output Summary (Last 24 hours) at 11/3/2021 0840  Last data filed at 11/3/2021 0400  Gross per 24 hour   Intake 1535 ml   Output 265 ml   Net 1270 ml            • ALPRAZolam  0.25 mg QDAY PRN   • metoprolol tartrate  25 mg BID   • Pharmacy Consult Request  1 Each PHARMACY TO DOSE   • MD ALERT...DO NOT ADMINISTER NSAIDS or ASPIRIN unless ORDERED By Neurosurgery  1 Each PRN   • docusate sodium  100 mg BID   • senna-docusate  1 Tablet Nightly   • senna-docusate  1 Tablet Q24HRS PRN   • polyethylene glycol/lytes  1 Packet BID PRN   • magnesium hydroxide   30 mL QDAY PRN   • bisacodyl  10 mg Q24HRS PRN   • sodium phosphate  1 Each Once PRN   • dextrose 5 % and 0.9 % NaCl with KCl 20 mEq   Continuous   • acetaminophen  1,000 mg Q6HRS    Followed by   • [START ON 11/7/2021] acetaminophen  1,000 mg Q6HRS PRN   • oxyCODONE immediate-release  5 mg Q3HRS PRN    Or   • oxyCODONE immediate-release  10 mg Q3HRS PRN    Or   • HYDROmorphone  0.5 mg Q3HRS PRN   • diphenhydrAMINE  25 mg Q6HRS PRN    Or   • diphenhydrAMINE  25 mg Q6HRS PRN   • ondansetron  4 mg Q4HRS PRN   • ondansetron  4 mg Q4HRS PRN   • promethazine  12.5-25 mg Q4HRS PRN   • promethazine  12.5-25 mg Q4HRS PRN   • prochlorperazine  5-10 mg Q4HRS PRN   • methocarbamol  750 mg Q8HRS PRN    Or   • cyclobenzaprine  10 mg Q8HRS PRN    Or   • diazePAM  5 mg Q4HRS PRN   • benzocaine-menthol  1 Lozenge Q2HRS PRN   • calcium carbonate  500 mg BID   • losartan  100 mg Q DAY    And   • hydroCHLOROthiazide  25 mg Q DAY       Assessment and Plan:  Hospital day #2  POD #2 Lumbar lami  Prophylactic anticoagulation: no         Start date/time: tbd    Neuro exam stable  Continue pain control, hvac  PT/OT, appreciate discharge dispo recs  Potentially home this afternoon if cleared by therapy & hvac output decreases

## 2021-11-04 VITALS
TEMPERATURE: 96.8 F | HEIGHT: 75 IN | BODY MASS INDEX: 30.48 KG/M2 | RESPIRATION RATE: 18 BRPM | HEART RATE: 60 BPM | WEIGHT: 245.15 LBS | OXYGEN SATURATION: 94 % | DIASTOLIC BLOOD PRESSURE: 84 MMHG | SYSTOLIC BLOOD PRESSURE: 120 MMHG

## 2021-11-04 PROCEDURE — 700102 HCHG RX REV CODE 250 W/ 637 OVERRIDE(OP): Performed by: PHYSICIAN ASSISTANT

## 2021-11-04 PROCEDURE — G0378 HOSPITAL OBSERVATION PER HR: HCPCS

## 2021-11-04 PROCEDURE — A9270 NON-COVERED ITEM OR SERVICE: HCPCS | Performed by: PHYSICIAN ASSISTANT

## 2021-11-04 RX ADMIN — OXYCODONE HYDROCHLORIDE 10 MG: 10 TABLET ORAL at 03:48

## 2021-11-04 RX ADMIN — OXYCODONE HYDROCHLORIDE 10 MG: 10 TABLET ORAL at 08:41

## 2021-11-04 RX ADMIN — METOPROLOL TARTRATE 25 MG: 25 TABLET, FILM COATED ORAL at 05:31

## 2021-11-04 RX ADMIN — ACETAMINOPHEN 1000 MG: 500 TABLET ORAL at 05:31

## 2021-11-04 RX ADMIN — HYDROCHLOROTHIAZIDE 25 MG: 25 TABLET ORAL at 05:32

## 2021-11-04 RX ADMIN — LOSARTAN POTASSIUM 100 MG: 50 TABLET, FILM COATED ORAL at 05:32

## 2021-11-04 RX ADMIN — CALCIUM CARBONATE 500 MG: 500 TABLET, CHEWABLE ORAL at 05:32

## 2021-11-04 RX ADMIN — DOCUSATE SODIUM 100 MG: 100 CAPSULE, LIQUID FILLED ORAL at 05:32

## 2021-11-04 ASSESSMENT — PAIN DESCRIPTION - PAIN TYPE
TYPE: ACUTE PAIN
TYPE: ACUTE PAIN;SURGICAL PAIN

## 2021-11-04 NOTE — PROGRESS NOTES
0705 Patient's sitting up in bed. Bedside report received from KEYONNA Escobar RN at the beginning of the shift.    0841 Patient's sitting up in bed. Educated on the importance/ use of IS at least 10x every hour while awake, able to reach 2250. Medicated with Oxycodone (see MAR) for c/o's back, right hip, buttocks and right thigh.  Fall protocol in effect. Call light within reach. Reminded patient to call for assist. Assessment completed. No distress noted. Plan of care reviewed with the patient. Verbalized understanding.    0853 New order received and acknowledged from SURJIT Coombs for the patient to be discharged home.    0928  Discharge instructions given to the patient. Questions answered. Verbalized understanding. Patient was discharged with patient's belongings, e prescriptions via w/c accompanied by one female CNA to discharge gee while waiting for daughter. STUART singleton.

## 2021-11-04 NOTE — CARE PLAN
The patient is Stable - Low risk of patient condition declining or worsening    Shift Goals  Clinical Goals: Safety, mobility, pain control   Patient Goals: pain control, to be discharged home    Progress made toward(s) clinical / shift goals:      Patient is not progressing towards the following goals:    Problem: Pain - Standard  Goal: Alleviation of pain or a reduction in pain to the patient’s comfort goal  Outcome: Progressing    Educated on pain scale. Encouraged ot verbalize pain. Will medicate as per MAR.     Problem: Knowledge Deficit - Standard  Goal: Patient and family/care givers will demonstrate understanding of plan of care, disease process/condition, diagnostic tests and medications  Outcome: Progressing    POC discussed with the patient. Discharge instructions given. Verbalized understanding.

## 2021-11-04 NOTE — PROGRESS NOTES
Neurosurgery Progress Note    Subjective:  Mild to moderate incisional pain, controlled on POs  Some persistent radicular right leg later thigh  Improved compared to pre-op    Exam:  A&O  BLE 5/5  Dressing CDI    BP  Min: 113/71  Max: 131/80  Pulse  Av.6  Min: 60  Max: 75  Resp  Av.8  Min: 17  Max: 18  Temp  Av.3 °C (97.3 °F)  Min: 36 °C (96.8 °F)  Max: 36.5 °C (97.7 °F)  SpO2  Av.3 %  Min: 94 %  Max: 97 %    No data recorded                      Intake/Output                       21 - 21 - 21 Total  Total                 Intake    P.O.  240  -- 240  --  -- --    P.O. 240 -- 240 -- -- --    Total Intake 240 -- 240 -- -- --       Output    Urine  --  -- --  --  -- --    Number of Times Voided 1 x -- 1 x -- -- --    Emesis  --  0 0  --  -- --    Emesis -- 0 0 -- -- --    Drains  120  25 145  --  -- --    Output (mL) ([REMOVED] Closed/Suction Drain 1 Back 10 Fr.) 120 25 145 -- -- --    Stool  --  -- --  --  -- --    Number of Times Stooled -- 1 x 1 x -- -- --    Total Output 120 25 145 -- -- --       Net I/O     120 -25 95 -- -- --            Intake/Output Summary (Last 24 hours) at 2021 0861  Last data filed at 2021 0348  Gross per 24 hour   Intake 240 ml   Output 145 ml   Net 95 ml            • ALPRAZolam  0.25 mg QDAY PRN   • metoprolol tartrate  25 mg BID   • Pharmacy Consult Request  1 Each PHARMACY TO DOSE   • MD ALERT...DO NOT ADMINISTER NSAIDS or ASPIRIN unless ORDERED By Neurosurgery  1 Each PRN   • docusate sodium  100 mg BID   • senna-docusate  1 Tablet Nightly   • senna-docusate  1 Tablet Q24HRS PRN   • polyethylene glycol/lytes  1 Packet BID PRN   • magnesium hydroxide  30 mL QDAY PRN   • bisacodyl  10 mg Q24HRS PRN   • sodium phosphate  1 Each Once PRN   • dextrose 5 % and 0.9 % NaCl with KCl 20 mEq   Continuous   • acetaminophen  1,000 mg Q6HRS    Followed by   • [START ON 2021]  acetaminophen  1,000 mg Q6HRS PRN   • oxyCODONE immediate-release  5 mg Q3HRS PRN    Or   • oxyCODONE immediate-release  10 mg Q3HRS PRN    Or   • HYDROmorphone  0.5 mg Q3HRS PRN   • diphenhydrAMINE  25 mg Q6HRS PRN    Or   • diphenhydrAMINE  25 mg Q6HRS PRN   • ondansetron  4 mg Q4HRS PRN   • ondansetron  4 mg Q4HRS PRN   • promethazine  12.5-25 mg Q4HRS PRN   • promethazine  12.5-25 mg Q4HRS PRN   • prochlorperazine  5-10 mg Q4HRS PRN   • methocarbamol  750 mg Q8HRS PRN    Or   • cyclobenzaprine  10 mg Q8HRS PRN    Or   • diazePAM  5 mg Q4HRS PRN   • benzocaine-menthol  1 Lozenge Q2HRS PRN   • calcium carbonate  500 mg BID   • losartan  100 mg Q DAY    And   • hydroCHLOROthiazide  25 mg Q DAY       Assessment and Plan:  Hospital day #3  POD #3 Lumbar lami  Prophylactic anticoagulation: no         Start date/time: tbd    Neuro exam stable  VSS  HVAC removed this AM  PT/OT: cleared to DC home    Will discharge home today  Prescriptions sent to his pharmacy [facility] to [facility] [address]

## 2021-11-04 NOTE — CARE PLAN
Problem: Pain - Standard  Goal: Alleviation of pain or a reduction in pain to the patient’s comfort goal  Outcome: Progressing     Problem: Knowledge Deficit - Standard  Goal: Patient and family/care givers will demonstrate understanding of plan of care, disease process/condition, diagnostic tests and medications  Outcome: Progressing     The patient is Stable - Low risk of patient condition declining or worsening    Shift Goals  Clinical Goals: pain management, mobility  Patient Goals: pain control, rest    Progress made toward(s) clinical / shift goals:  POC discussed with pt at bedside. Pt asks appropriate questions, no additional concerns at this time.  Pt states pain 7/10. Medications administered per MAR, ice pack applied, pillows for comfort and repositioning.      Patient is not progressing towards the following goals:

## 2021-11-04 NOTE — DISCHARGE INSTRUCTIONS
Discharge Instructions    Discharged to home by car with relative. Discharged via wheelchair, hospital escort: Yes.  Special equipment needed: Not Applicable    Be sure to schedule a follow-up appointment with your primary care doctor or any specialists as instructed.     Discharge Plan:        I understand that a diet low in cholesterol, fat, and sodium is recommended for good health. Unless I have been given specific instructions below for another diet, I accept this instruction as my diet prescription.   Other diet: Regular    Special Instructions:  Leave incision open to air.   OK to shower & pat dry starting 24hr after the drain has been removed  No soaking in hot tubs, baths, pools, etc.  Avoid repetitive bending, lifting over 10lbs, twisting. We encourage you to take frequent walks as tolerated  Do not drive or consume alcohol while taking narcotic pain medications. Do not take additional acetaminophen (tylenol) without consulting our office.   Your scripts have been sent electronically to [facility] [address]  Do not take NSAIDs (such as ibuprofen or naproxen) or Aspirin unless you have been told otherwise by Dr Pichardo's team  Follow up at [facility] office in 2 weeks. Call with questions or concerns @ [phone]      Laminectomy, Care After  This sheet gives you information about how to care for yourself after your procedure. Your health care provider may also give you more specific instructions. If you have problems or questions, contact your health care provider.  What can I expect after the procedure?  After the procedure, it is common to have:  · Some pain around your incision area.  · Muscle tightening (spasms) across the back.  Follow these instructions at home:  Incision care    · Follow instructions from your health care provider about how to take care of your incision area. Make sure you:  ? Wash your hands with soap and water before and after you apply medicine to the area or change  your bandage (dressing). If soap and water are not available, use hand .  ? Change your dressing as told by your health care provider.  ? Leave stitches (sutures), skin glue, or adhesive strips in place. These skin closures may need to stay in place for 2 weeks or longer. If adhesive strip edges start to loosen and curl up, you may trim the loose edges. Do not remove adhesive strips completely unless your health care provider tells you to do that.  · Check your incision area every day for signs of infection. Check for:  ? More redness, swelling, or pain.  ? More fluid or blood.  ? Warmth.  ? Pus or a bad smell.  Medicines  · Take over-the-counter and prescription medicines only as told by your health care provider.  · If you were prescribed an antibiotic medicine, use it as told by your health care provider. Do not stop using the antibiotic even if you start to feel better.  Bathing  · Do not take baths, swim, or use a hot tub for 2 weeks, or until your incision has healed completely.  · If your health care provider approves, you may take showers after your dressing has been removed.  Activity    · Return to your normal activities as told by your health care provider. Ask your health care provider what activities are safe for you.  · Avoid bending or twisting at your waist. Always bend at your knees.  · Do not sit for more than 20-30 minutes at a time. Lie down or walk between periods of sitting.  · Do not lift anything that is heavier than 10 lb (4.5 kg) or the limit that your health care provider tells you, until he or she says that it is safe.  · Do not drive for 2 weeks after your procedure or for as long as your health care provider tells you.  · Do not drive or use heavy machinery while taking prescription pain medicine.  General instructions  · To prevent or treat constipation while you are taking prescription pain medicine, your health care provider may recommend that you:  ? Drink enough fluid to  keep your urine clear or pale yellow.  ? Take over-the-counter or prescription medicines.  ? Eat foods that are high in fiber, such as fresh fruits and vegetables, whole grains, and beans.  ? Limit foods that are high in fat and processed sugars, such as fried and sweet foods.  · Do breathing exercises as told.  · Keep all follow-up visits as told by your health care provider. This is important.  Contact a health care provider if:  · You have more redness, swelling, or pain around your incision area.  · Your incision feels warm to the touch.  · You are not able to return to activities or do exercises as told by your health care provider.  Get help right away if:  · You have:  ? More fluid or blood coming from your incision area.  ? Pus or a bad smell coming from your incision area.  ? Chills or a fever.  ? Episodes of dizziness or fainting while standing.  · You develop a rash.  · You develop shortness of breath or you have difficulty breathing.  · You cannot control when you urinate or have a bowel movement.  · You become weak.  · You are not able to use your legs.  Summary  · After the procedure, it is common to have some pain around your incision area. You may also have muscle tightening (spasms) across the back.  · Follow instructions from your health care provider about how to care for your incision.  · Do not lift anything that is heavier than 10 lb (4.5 kg) or the limit that your health care provider tells you, until he or she says that it is safe.  · Contact your health care provider if you have more redness, swelling, or pain around your incision area or if your incision feels warm to the touch. These can be signs of infection.  This information is not intended to replace advice given to you by your health care provider. Make sure you discuss any questions you have with your health care provider.  Document Released: 07/07/2006 Document Revised: 11/30/2018 Document Reviewed: 06/04/2017  [facility] Patient  Education © 2020 [employer]      · Is patient discharged on Warfarin / Coumadin?   No     Depression / Suicide Risk    As you are discharged from this [facility] facility, it is important to learn how to keep safe from harming yourself.    Recognize the warning signs:  · Abrupt changes in personality, positive or negative- including increase in energy   · Giving away possessions  · Change in eating patterns- significant weight changes-  positive or negative  · Change in sleeping patterns- unable to sleep or sleeping all the time   · Unwillingness or inability to communicate  · Depression  · Unusual sadness, discouragement and loneliness  · Talk of wanting to die  · Neglect of personal appearance   · Rebelliousness- reckless behavior  · Withdrawal from people/activities they love  · Confusion- inability to concentrate     If you or a loved one observes any of these behaviors or has concerns about self-harm, here's what you can do:  · Talk about it- your feelings and reasons for harming yourself  · Remove any means that you might use to hurt yourself (examples: pills, rope, extension cords, firearm)  · Get professional help from the community (Mental Health, Substance Abuse, psychological counseling)  · Do not be alone:Call your Safe Contact- someone whom you trust who will be there for you.  · Call your local CRISIS HOTLINE 178-3182 or 090-341-3265  · Call your local Children's Mobile Crisis Response Team [place] (457) 652-5342 or www.[place]  · Call the toll free National Suicide Prevention Hotlines   · National Suicide Prevention Lifeline 514-633-HBUT (9315)  · National Hope Line Network 800-SUICIDE (389-9497)

## 2021-11-04 NOTE — PROGRESS NOTES
Mobility Progress Note    Surgery patient: yes  Date of surgery: 11/2/2021  Ambulated 50 ft on day of surgery? (N/A if patient did not undergo surgery today): n/a  Number of times ambulated 50 feet or greater today: 3  Patient has been up to chair, edge of bed or HOB 90 degrees for all meals?: yes  Goal met? (goal is ambulating at least 50 feet 2 times on day shift, one time on night shift): yes  If patient did not meet mobility goal, why?: n/a

## 2021-11-04 NOTE — PROGRESS NOTES
Removed hemovack from patient's back per orders, drain output was less than 30 ml. Patient tolerated the removal well.

## 2021-11-04 NOTE — DISCHARGE SUMMARY
Discharge Summary    CHIEF COMPLAINT ON ADMISSION  No chief complaint on file.      Reason for Admission  SPINAL STENOSIS     Admission Date  11/2/2021    CODE STATUS  Full Code    HPI & HOSPITAL COURSE  This is a 60 y.o. male here with lumbar radiculopathy. Pt presented for elective spine surgery, which proceeded without complicaiton.  His pain is controlled with oral medications.  His radicular symptoms down his right leg has improved compared to preop.  He has been eating, drinking and was cleared by physical therapy to discharge home.  We will discharge him home.  No notes on file    Therefore, he is discharged in good and stable condition to home with close outpatient follow-up.    The patient recovered much more quickly than anticipated on admission.    Discharge Date   11/4/21 if meeting criteria    FOLLOW UP ITEMS POST DISCHARGE  Keep scheduled apt at [facility]    DISCHARGE DIAGNOSES  Active Problems:    * No active hospital problems. *  Resolved Problems:    * No resolved hospital problems. *      FOLLOW UP  No future appointments.  Cory Villavicencio M.D.  [address]  [facility] [address]  [phone]    Call  As needed    Lon Pichardo M.D.  [address] [facility] [address]  [phone]    Schedule an appointment as soon as possible for a visit  Follow-Up      MEDICATIONS ON DISCHARGE     Medication List      START taking these medications      Instructions   cyclobenzaprine 10 mg Tabs  Commonly known as: Flexeril   Take 1 Tablet by mouth 3 times a day as needed.  Dose: 10 mg     oxyCODONE-acetaminophen 5-325 MG Tabs  Commonly known as: PERCOCET   Take 1 Tablet by mouth every four hours as needed for up to 7 days.  Dose: 1 Tablet        CHANGE how you take these medications      Instructions   fluticasone 50 MCG/ACT nasal spray  What changed: when to take this  Commonly known as: FLONASE   SPRAY 1 SPRAY IN EACH NOSTRIL EVERY DAY        CONTINUE taking these medications      Instructions   ALPRAZolam 0.25  "MG Tabs  Commonly known as: XANAX   Take 1 Tab by mouth 1 time daily as needed for Anxiety.  Dose: 0.25 mg     losartan-hydrochlorothiazide 100-25 MG per tablet  Commonly known as: HYZAAR   Take 1 Tablet by mouth every day.  Dose: 1 Tablet     metoprolol tartrate 25 MG Tabs  Commonly known as: LOPRESSOR   Take 25 mg by mouth 2 times a day.  Dose: 25 mg        STOP taking these medications    KLS Diclofenac Sodium 1 % Gel  Generic drug: diclofenac sodium          Rx for percocet 5/325mg 1 tab q4-6hr prn x7d #42 NR & flexeril 10mg TID x14d were e-scribed to [facility] [address] in [place]   reviewed, low risk per Ort, informed consent obtained    Allergies  Allergies   Allergen Reactions   • Codeine Hives and Nausea   • Hydrocodone Itching   • Pcn [Penicillins] Unspecified     Pt states \"It's a childhood allergies\".        DIET  Orders Placed This Encounter   Procedures   • Diet Order Diet: Regular     Standing Status:   Standing     Number of Occurrences:   1     Order Specific Question:   Diet:     Answer:   Regular [1]       ACTIVITY  Light duty.  Weight bearing as tolerated    CONSULTATIONS       PROCEDURES   L3-5 laminectomy, L3-S1 bilateral medial facetectomy & bilateral foraminotomies  Dr Lon Pichardo, 11/2/21    LABORATORY  Lab Results   Component Value Date    SODIUM 138 10/26/2021    POTASSIUM 4.0 10/26/2021    CHLORIDE 102 10/26/2021    CO2 25 10/26/2021    GLUCOSE 111 (H) 10/26/2021    BUN 17 10/26/2021    CREATININE 0.84 10/26/2021    CREATININE 1.0 04/27/2009        Lab Results   Component Value Date    WBC 9.6 10/26/2021    HEMOGLOBIN 17.4 10/26/2021    HEMATOCRIT 51.2 10/26/2021    PLATELETCT 296 10/26/2021        Total time of the discharge process exceeds 30 minutes.  "

## 2022-08-27 ENCOUNTER — APPOINTMENT (OUTPATIENT)
Dept: URGENT CARE | Facility: PHYSICIAN GROUP | Age: 62
End: 2022-08-27
Payer: COMMERCIAL

## 2022-08-28 ENCOUNTER — OFFICE VISIT (OUTPATIENT)
Dept: URGENT CARE | Facility: PHYSICIAN GROUP | Age: 62
End: 2022-08-28
Payer: COMMERCIAL

## 2022-08-28 ENCOUNTER — HOSPITAL ENCOUNTER (OUTPATIENT)
Dept: RADIOLOGY | Facility: MEDICAL CENTER | Age: 62
End: 2022-08-28
Attending: PHYSICIAN ASSISTANT
Payer: COMMERCIAL

## 2022-08-28 VITALS
TEMPERATURE: 97.8 F | RESPIRATION RATE: 12 BRPM | DIASTOLIC BLOOD PRESSURE: 80 MMHG | OXYGEN SATURATION: 96 % | SYSTOLIC BLOOD PRESSURE: 128 MMHG | BODY MASS INDEX: 31.08 KG/M2 | HEIGHT: 75 IN | WEIGHT: 250 LBS | HEART RATE: 100 BPM

## 2022-08-28 DIAGNOSIS — M79.89 LEG SWELLING: ICD-10-CM

## 2022-08-28 DIAGNOSIS — L03.116 CELLULITIS OF LEFT LOWER EXTREMITY: ICD-10-CM

## 2022-08-28 PROCEDURE — 93971 EXTREMITY STUDY: CPT | Mod: LT

## 2022-08-28 PROCEDURE — 99214 OFFICE O/P EST MOD 30 MIN: CPT | Performed by: PHYSICIAN ASSISTANT

## 2022-08-28 RX ORDER — CEPHALEXIN 500 MG/1
500 CAPSULE ORAL 4 TIMES DAILY
Qty: 20 CAPSULE | Refills: 0 | Status: SHIPPED | OUTPATIENT
Start: 2022-08-28 | End: 2022-09-02

## 2022-08-28 NOTE — PROGRESS NOTES
Subjective:   Alan Prabhakar Jr. is a 61 y.o. male who presents for Ankle Swelling (L ankle swelling, constant pain, pain going through back of knee x4 days )       Patient presents with chief complaint of left lower extremity swelling of approximately 4 days duration.  He denies antecedent trauma.  He reports the pain came on without event.  He reports swelling to the left ankle as well as discomfort behind the knee and in the calf.  No ecchymosis.  He has no history of cancer.  No history of prolonged immobilization although does sit during the majority of his day.  No history of DVT or PE.  No recent surgery.  Denies shortness of breath or hemoptysis.  No myalgias.  No fever or chills.      Medications:  ALPRAZolam Tabs  cyclobenzaprine Tabs  fluticasone  losartan-hydrochlorothiazide  metoprolol tartrate Tabs    Allergies:             Codeine, Hydrocodone, and Pcn [penicillins]    Surgical History:         Past Surgical History:   Procedure Laterality Date    PB LAMINOTOMY,LUMBAR DISK,1 INTRSP  11/2/2021    Procedure: LAMINECTOMY, SPINE, LUMBAR, WITH DISCECTOMY - L3-5 WITH MEDIAL FACETECTOMY;  Surgeon: Lon Pichardo M.D.;  Location: SURGERY [facility];  Service: Neurosurgery    FORAMINOTOMY  11/2/2021    Procedure: FORAMINOTOMY, SPINE;  Surgeon: Lon Pichardo M.D.;  Location: [facility];  Service: Neurosurgery    KNEE ARTHROSCOPY  6/19/2013    Performed by Jim Davila M.D. at SURGERY [facility]    MENISCECTOMY, KNEE, MEDIAL  6/19/2013    Performed by Jim Davila M.D. at [facility]S    INJ,EPIDURAL/LUMB/SAC SINGLE  3/5/2012    Performed by GIAN ESCOBAR at SURGERY [facility]    TONSILLECTOMY  2004    SHOULDER ARTHROSCOPY  2004    left    KNEE ARTHROSCOPY  [id number]    left       Past Social Hx:  Alan Prabhakar Jr.  reports that he has been smoking cigarettes and cigars. He started smoking about 44 years ago. He has a 18.50 pack-year smoking history.  "He has quit using smokeless tobacco. He reports current alcohol use of about 12.6 - 13.2 oz per week. He reports that he does not use drugs.     Past Family Hx:   Alan Prabhakar Jr. family history includes Colon Cancer in his father; Hypertension in an other family member; Lung Disease in his mother.       Problem list, medications, and allergies reviewed by myself today in Epic.     Objective:     /80   Pulse 100   Temp 36.6 °C (97.8 °F) (Temporal)   Resp 12   Ht 1.905 m (6' 3\")   Wt 113 kg (250 lb)   SpO2 96%   BMI 31.25 kg/m²     Physical Exam  Vitals and nursing note reviewed.   Constitutional:       General: He is not in acute distress.     Appearance: Normal appearance. He is not ill-appearing.   HENT:      Head: Normocephalic.   Eyes:      Extraocular Movements: Extraocular movements intact.      Pupils: Pupils are equal, round, and reactive to light.   Cardiovascular:      Rate and Rhythm: Normal rate.   Pulmonary:      Effort: Pulmonary effort is normal.      Breath sounds: Normal breath sounds. No decreased breath sounds, wheezing, rhonchi or rales.   Musculoskeletal:        Legs:       Comments: There is focal tenderness to the left popliteal fossa on the left gastrocnemius.  There is minimal erythema and mild swelling to the ankle.  There is no overlying ecchymosis.  There is no bony tenderness.  There is no joint line tenderness to the knee.  No signs of effusion.  Minimal varicosities.   Skin:     General: Skin is warm.      Findings: No rash.   Neurological:      Mental Status: He is alert and oriented to person, place, and time.   Psychiatric:         Thought Content: Thought content normal.         Judgment: Judgment normal.       RADIOLOGY RESULTS   US-EXTREMITY VENOUS LOWER UNILAT LEFT    Result Date: 8/28/2022 8/28/2022 4:15 PM HISTORY/REASON FOR EXAM:  Swelling of Limb Left lower extremity edema TECHNIQUE/EXAM DESCRIPTION: Using both color and pulsed-wave Doppler imaging, " multiple images were obtained from the left common femoral vein origin distally through the popliteal trifurcation.  Graded compression was used to demonstrate a patent lumen. COMPARISON:  None. FINDINGS: There is no evidence of luminal thrombus.  There is normal augmentation to flow and respiratory variation.     1. No evidence of left lower extremity deep venous thrombosis.             Assessment/Plan:     Diagnosis and Associated Orders:     1. Leg swelling  - US-EXTREMITY VENOUS LOWER UNILAT LEFT; Future    2. Cellulitis of left lower extremity  - cephALEXin (KEFLEX) 500 MG Cap; Take 1 Capsule by mouth 4 times a day for 5 days.  Dispense: 20 Capsule; Refill: 0      Comments/MDM:    Patient presents with 4-day history of left lower extremity pain and swelling.  Tenderness palpation along the popliteal fossa and gastrocnemius.  No recent trauma.  Minimal risk factors for DVT however feel ultrasound is warranted given lack of trauma.  No obvious signs of gout.  No systemic or constitutional symptoms.  No shortness of breath or hemoptysis.  We will be in touch with patient as soon as results are available    Addendum:    RADIOLOGY RESULTS   US-EXTREMITY VENOUS LOWER UNILAT LEFT    Result Date: 8/28/2022 8/28/2022 4:15 PM HISTORY/REASON FOR EXAM:  Swelling of Limb Left lower extremity edema TECHNIQUE/EXAM DESCRIPTION: Using both color and pulsed-wave Doppler imaging, multiple images were obtained from the left common femoral vein origin distally through the popliteal trifurcation.  Graded compression was used to demonstrate a patent lumen. COMPARISON:  None. FINDINGS: There is no evidence of luminal thrombus.  There is normal augmentation to flow and respiratory variation.     1. No evidence of left lower extremity deep venous thrombosis.         Given findings on ultrasound that demonstrated no evidence of DVT with generalized erythema and warmth that he states has been fluctuating we will treat for cellulitis.  He  will follow-up with his PCP.  He is to follow-up with increased redness, increased swelling, fever, chills, shortness of breath, chest pain, hemoptysis.  Elevate lower extremity.  Ibuprofen/Tylenol as needed for pain.    Greater than 37 minutes was spent reviewing this patient's past medical history, ruling out DVT, reviewing ultrasound findings with patient over the telephone later that day and discussing care for cellulitis and return precautions.    I personally reviewed prior external notes and test results pertinent to today's visit.  Red flags discussed as well as indications to present to the Emergency Department.  Supportive care, natural history, differential diagnoses, and indications for immediate follow-up discussed.  Patient expresses understanding and agrees to plan.  Patient denies any other questions or concerns.    Follow-up with the primary care physician for recheck, reevaluation, and consideration of further management.      Please note that this dictation was created using voice recognition software. I have made a reasonable attempt to correct obvious errors, but I expect that there are errors of grammar and possibly content that I did not discover before finalizing the note.    This note was electronically signed by Nany Zamarripa PA-C

## 2023-02-25 ENCOUNTER — OFFICE VISIT (OUTPATIENT)
Dept: URGENT CARE | Facility: PHYSICIAN GROUP | Age: 63
End: 2023-02-25
Payer: COMMERCIAL

## 2023-02-25 ENCOUNTER — HOSPITAL ENCOUNTER (OUTPATIENT)
Dept: RADIOLOGY | Facility: MEDICAL CENTER | Age: 63
End: 2023-02-25
Attending: PHYSICIAN ASSISTANT
Payer: COMMERCIAL

## 2023-02-25 VITALS
WEIGHT: 245 LBS | RESPIRATION RATE: 18 BRPM | HEART RATE: 115 BPM | TEMPERATURE: 98.6 F | HEIGHT: 75 IN | DIASTOLIC BLOOD PRESSURE: 72 MMHG | OXYGEN SATURATION: 96 % | BODY MASS INDEX: 30.46 KG/M2 | SYSTOLIC BLOOD PRESSURE: 138 MMHG

## 2023-02-25 DIAGNOSIS — M25.562 ACUTE PAIN OF LEFT KNEE: ICD-10-CM

## 2023-02-25 DIAGNOSIS — S86.912A KNEE STRAIN, LEFT, INITIAL ENCOUNTER: ICD-10-CM

## 2023-02-25 PROCEDURE — 73564 X-RAY EXAM KNEE 4 OR MORE: CPT | Mod: LT

## 2023-02-25 PROCEDURE — 99214 OFFICE O/P EST MOD 30 MIN: CPT | Performed by: PHYSICIAN ASSISTANT

## 2023-02-25 ASSESSMENT — ENCOUNTER SYMPTOMS
FEVER: 0
JOINT SWELLING: 1
ABDOMINAL PAIN: 0
COUGH: 0
NUMBNESS: 0
WEAKNESS: 0

## 2023-02-25 NOTE — LETTER
February 25, 2023    To Whom It May Concern:         This is confirmation that Alan Prabhakar  Was seen by Arnaldo Guo P.A.-C. on 2/25/23 in the [facility]. Please excuse him from work until 02/27/23. He may return on 02/28/23         If you have any questions please do not hesitate to call me at the phone number listed below.    Sincerely,          BRITTNEY Doherty.  [phone]

## 2023-02-25 NOTE — PROGRESS NOTES
"Subjective     Alan Prabhakar Jr. is a 62 y.o. male who presents with Knee Pain (X 1 day, Left knee, keeps buckling, swelling, tightness in back of knee, unable to fully extend knee )            Very pleasant 62-year-old male patient presenting with left knee pain for the last few days.  No significant traumatic injury, fall or precipitating event.  He was walking up stairs and felt his left knee buckle and catch.  He has had multiple instances of the same over the last few days.  History of chronic left knee arthritis.  He has had 5 arthroscopic surgeries mainly for meniscus repair.    Knee Pain  This is a chronic problem. The current episode started in the past 7 days. The problem occurs constantly. The problem has been rapidly worsening. Associated symptoms include joint swelling. Pertinent negatives include no abdominal pain, congestion, coughing, fever, numbness or weakness. The symptoms are aggravated by exertion and walking. He has tried acetaminophen for the symptoms. The treatment provided mild relief.     PMH:  has a past medical history of Anesthesia (10/26/2021), Anxiety (9/7/2012), Arthritis, ASTHMA (9/7/2012), BMI 30.0-30.9,adult (3/17/2015), Chronic lower back pain (9/7/2012), GERD (gastroesophageal reflux disease), Hiatus hernia syndrome (2001), Hypertension, Hypertension (9/7/2012), Indigestion, Influenza, Psychiatric disorder, Snoring, and Tonsillitis.  MEDS:   Current Outpatient Medications:     losartan-hydrochlorothiazide (HYZAAR) 100-25 MG per tablet, Take 1 Tablet by mouth every day., Disp: , Rfl: 8  ALLERGIES:   Allergies   Allergen Reactions    Codeine Hives and Nausea    Hydrocodone Itching    Pcn [Penicillins] Unspecified     Pt states \"It's a childhood allergies\".      SURGHX:   Past Surgical History:   Procedure Laterality Date    PB LAMINOTOMY,LUMBAR DISK,1 INTRSP  11/2/2021    Procedure: LAMINECTOMY, SPINE, LUMBAR, WITH DISCECTOMY - L3-5 WITH MEDIAL FACETECTOMY;  Surgeon: " "Lon Pichardo M.D.;  Location: SURGERY [facility];  Service: Neurosurgery    FORAMINOTOMY  11/2/2021    Procedure: FORAMINOTOMY, SPINE;  Surgeon: Lon Pichardo M.D.;  Location: SURGERY [facility];  Service: Neurosurgery    KNEE ARTHROSCOPY  6/19/2013    Performed by Jim Davila M.D. at SURGERY [facility]    MENISCECTOMY, KNEE, MEDIAL  6/19/2013    Performed by Jim Davila M.D. at SURGERY [facility]    INJ,EPIDURAL/LUMB/SAC SINGLE  3/5/2012    Performed by GIAN ESCOBAR at SURGERY [facility]    TONSILLECTOMY  2004    SHOULDER ARTHROSCOPY  2004    left    KNEE ARTHROSCOPY  [id number]    left     SOCHX:  reports that he has been smoking cigarettes and cigars. He started smoking about 45 years ago. He has a 18.50 pack-year smoking history. He has quit using smokeless tobacco. He reports current alcohol use of about 12.6 - 13.2 oz per week. He reports that he does not use drugs.  FH: family history includes Colon Cancer in his father; Hypertension in an other family member; Lung Disease in his mother.    Review of Systems   Constitutional:  Negative for fever.   HENT:  Negative for congestion.    Respiratory:  Negative for cough.    Gastrointestinal:  Negative for abdominal pain.   Musculoskeletal:  Positive for joint swelling.   Neurological:  Negative for weakness and numbness.     Medications, Allergies, and current problem list reviewed today in Epic           Objective     /72   Pulse (!) 115   Temp 37 °C (98.6 °F) (Temporal)   Resp 18   Ht 1.905 m (6' 3\")   Wt 111 kg (245 lb)   SpO2 96%   BMI 30.62 kg/m²      Physical Exam  Vitals and nursing note reviewed.   Constitutional:       General: He is not in acute distress.     Appearance: Normal appearance. He is well-developed and normal weight. He is not ill-appearing, toxic-appearing or diaphoretic.   HENT:      Head: Normocephalic and atraumatic.      Right Ear: Hearing and external ear normal.      Left Ear: Hearing and " external ear normal.      Nose: Nose normal.      Mouth/Throat:      Dentition: Normal dentition. No dental caries.   Eyes:      General:         Right eye: No discharge.         Left eye: No discharge.      Conjunctiva/sclera: Conjunctivae normal.   Neck:      Thyroid: No thyromegaly.      Trachea: No tracheal deviation.   Cardiovascular:      Rate and Rhythm: Normal rate and regular rhythm.      Pulses: Normal pulses.      Heart sounds: Normal heart sounds. No murmur heard.  Pulmonary:      Effort: Pulmonary effort is normal.      Breath sounds: Normal breath sounds. No wheezing.   Musculoskeletal:      Cervical back: Normal range of motion and neck supple.      Left knee: Swelling and effusion present. No erythema, ecchymosis, bony tenderness or crepitus. Decreased range of motion. Tenderness present over the medial joint line, lateral joint line and PCL. Normal alignment.      Instability Tests: Anterior drawer test negative. Posterior drawer test negative.   Skin:     General: Skin is warm and dry.      Nails: There is no clubbing.   Neurological:      General: No focal deficit present.      Mental Status: He is alert and oriented to person, place, and time. Mental status is at baseline.   Psychiatric:         Mood and Affect: Mood normal.         Behavior: Behavior normal.         Thought Content: Thought content normal.         Judgment: Judgment normal.                           Assessment & Plan     Very pleasant 62-year-old male patient presenting with acute on chronic left knee pain.  For the past 2 days he has had pain, swelling with decreased range of motion.  He has had multiple instances of knee giving out or buckling.  There is no erythema, ecchymosis or malalignment.  History of left knee osteoarthritis and 5 arthroscopic surgeries.  Exam shows swelling, effusion and generalized TTP worse along the joint lines.  Limited range of motion and pain with weightbearing noted.  X-ray shows mild to moderate  osteoarthritis which has worsened since previous x-ray.  Patient was placed in a knee brace and a referral has been placed to orthopedics.  RICE therapy as discussed.       1. Acute pain of left knee  DX-KNEE COMPLETE 4+ LEFT      2. Knee strain, left, initial encounter  Referral to Orthopedics           Return to clinic or go to ED if symptoms worsen or persist. Indications for ED discussed at length. Patient/Parent/Guardian voices understanding. Follow-up with your primary care provider in 3-5 days. Red flag symptoms discussed. All side effects of medication discussed including allergic response, GI upset, tendon injury, rash, sedation etc.    Please note that this dictation was created using voice recognition software. I have made every reasonable attempt to correct obvious errors, but I expect that there are errors of grammar and possibly content that I did not discover before finalizing the note.

## 2023-07-24 ENCOUNTER — OFFICE VISIT (OUTPATIENT)
Dept: URGENT CARE | Facility: PHYSICIAN GROUP | Age: 63
End: 2023-07-24
Payer: COMMERCIAL

## 2023-07-24 VITALS
SYSTOLIC BLOOD PRESSURE: 136 MMHG | RESPIRATION RATE: 18 BRPM | WEIGHT: 244 LBS | BODY MASS INDEX: 30.34 KG/M2 | TEMPERATURE: 97.5 F | HEART RATE: 88 BPM | HEIGHT: 75 IN | DIASTOLIC BLOOD PRESSURE: 80 MMHG | OXYGEN SATURATION: 98 %

## 2023-07-24 DIAGNOSIS — R21 RASH: ICD-10-CM

## 2023-07-24 DIAGNOSIS — M54.50 ACUTE BILATERAL LOW BACK PAIN WITHOUT SCIATICA: ICD-10-CM

## 2023-07-24 PROCEDURE — 99213 OFFICE O/P EST LOW 20 MIN: CPT

## 2023-07-24 PROCEDURE — 3079F DIAST BP 80-89 MM HG: CPT

## 2023-07-24 PROCEDURE — 3075F SYST BP GE 130 - 139MM HG: CPT

## 2023-07-24 RX ORDER — TRIAMCINOLONE ACETONIDE OINTMENT USP, 0.05% 0.5 MG/G
1 OINTMENT TOPICAL 2 TIMES DAILY PRN
Qty: 110 G | Refills: 0 | Status: SHIPPED | OUTPATIENT
Start: 2023-07-24 | End: 2023-07-31

## 2023-07-24 RX ORDER — METHYLPREDNISOLONE 4 MG/1
TABLET ORAL
Qty: 21 TABLET | Refills: 0 | Status: SHIPPED | OUTPATIENT
Start: 2023-07-24

## 2023-07-24 RX ORDER — ALPRAZOLAM 0.25 MG/1
TABLET ORAL
COMMUNITY

## 2023-07-24 NOTE — PROGRESS NOTES
Subjective:   Alan Prabhakar Jr. is a 62 y.o. male who presents for Rash (X 1 mth after angiogram both hands rash, peeling skin. X 3 wk mid back pain, kidney pain.)      HPI:    Patient presents to urgent care with concerns of rash  Rash involves bilateral hands, on the palmar surface and the dorsal surface around his cuticles  Has been present for one month  Rash has not spread  Rash is pruritic not painful  Not associate with fever, arthralgias, URI symptoms, or other recent viral syndromes  Interventions today: Over-the-counter hydrocortisone cream, antihistamines, lotion, mildly responsive  States rash appeared after angiogram with contrast. Denies hives or respiratory complaint after scan.  Otherwise, denies known contact with allergens, new lotions, soaps, detergents, metal  Patient denies new medications      Secondary to the above, the patient is also concerned about acute low back discomfort.   Reports increased physical activity with grandchildren and walking dogs.  Patient localizes pain to the lumbar muscles .  No radiation  Pain is described as sharp with extension, rotation, bending maneuvers.  Painful to sit and stand  Relieved with rest, 800 mg of ibuprofen  No associated symptoms like: Fever, bowel/bladder incontinence, saddle anesthesia, neurological deficits.  Reports laminectomy two years ago, pain is not similar to his low back pain prior to surgery.  Denies history of steroid use, malignancy, infection, depression.  Denies any recent trauma or occupational injury.         ROS As above in HPI    Medications:    Current Outpatient Medications on File Prior to Visit   Medication Sig Dispense Refill    ALPRAZolam (XANAX) 0.25 MG Tab Take 1 tablet 3 times a day by oral route as needed.      losartan-hydrochlorothiazide (HYZAAR) 100-25 MG per tablet Take 1 Tablet by mouth every day.  8     No current facility-administered medications on file prior to visit.        Allergies:   Codeine,  Hydrocodone, and Pcn [penicillins]    Problem List:   Patient Active Problem List   Diagnosis    Smoker    Asthma    Hypertension    Anxiety    Chronic lower back pain    Allergic rhinitis    BMI 30.0-30.9,adult    Tubular adenoma of colon    Pain    Pain of upper abdomen    Gastroesophageal reflux disease without esophagitis    Hiatal hernia    Nephrolithiasis    COPD (chronic obstructive pulmonary disease) ([facility])    Cigarette smoker    Obesity (BMI 30-39.9)    Cutaneous skin tags        Surgical History:  Past Surgical History:   Procedure Laterality Date    PB LAMINOTOMY,LUMBAR DISK,1 INTRSP  11/2/2021    Procedure: LAMINECTOMY, SPINE, LUMBAR, WITH DISCECTOMY - L3-5 WITH MEDIAL FACETECTOMY;  Surgeon: Lon Pichardo M.D.;  Location: SURGERY [facility];  Service: Neurosurgery    FORAMINOTOMY  11/2/2021    Procedure: FORAMINOTOMY, SPINE;  Surgeon: Lon Pichardo M.D.;  Location: SURGERY [facility];  Service: Neurosurgery    KNEE ARTHROSCOPY  6/19/2013    Performed by Jim Davila M.D. at SURGERY [facility]    MENISCECTOMY, KNEE, MEDIAL  6/19/2013    Performed by Jim Davila M.D. at [facility]    INJ,EPIDURAL/LUMB/SAC SINGLE  3/5/2012    Performed by GIAN ESCOBAR at SURGERY [facility]    TONSILLECTOMY  2004    SHOULDER ARTHROSCOPY  2004    left    KNEE ARTHROSCOPY  [id number]    left       Past Social Hx:   Social History     Tobacco Use    Smoking status: Every Day     Packs/day: 0.50     Years: 37.00     Pack years: 18.50     Types: Cigarettes, Cigars     Start date: 9/10/1977    Smokeless tobacco: Former    Tobacco comments:     from 2 pack decreased to pack a day.  Started at 15yo. Patient is smoking one cigar. 10/26-pt down to 1/2 pack.   Vaping Use    Vaping Use: Never used   Substance Use Topics    Alcohol use: Yes     Alcohol/week: 12.6 - 13.2 oz     Types: 1 - 2 Cans of beer, 20 Standard drinks or equivalent per week     Comment: 8 drinks/week    Drug use: No     "      Problem list, medications, and allergies reviewed by myself today in Epic.     Objective:     /80 (BP Location: Left arm, Patient Position: Sitting, BP Cuff Size: Adult)   Pulse 88   Temp 36.4 °C (97.5 °F) (Temporal)   Resp 18   Ht 1.905 m (6' 3\")   Wt 111 kg (244 lb)   SpO2 98%   BMI 30.50 kg/m²     Physical Exam  Vitals and nursing note reviewed.   Constitutional:       Appearance: Normal appearance.   Cardiovascular:      Rate and Rhythm: Normal rate and regular rhythm.      Heart sounds: Normal heart sounds.   Pulmonary:      Effort: Pulmonary effort is normal.      Breath sounds: Normal breath sounds.   Abdominal:      General: Bowel sounds are normal.      Palpations: Abdomen is soft.   Musculoskeletal:         General: Tenderness present. No swelling, deformity or signs of injury.      Comments: Bilateral lumbar paraspinal muscles are tender to palpation. No deformity, dislocation, crepitus, bony step offs. Cervical and thoracic spinous processes are not tender to palpation. No midline tenderness of lumbar spine. No tenderness to palpation of bilateral hips. Slightly reduced range of motions secondary to pain. Sensation is intact to light and sharp touch and is symmetric, cap refill is less than 2 seconds, 2+ DP pulses bilaterally. No edema, erythema, fluctuance, warmth. No ecchymosis. No rash. Negative R SLR, negative L SLR.    Skin:     Capillary Refill: Capillary refill takes less than 2 seconds.      Findings: Rash present. No erythema.      Comments: Pink papules present on bilateral palms, lateral aspects of fingers, and dorsal surface of cuticle areas of fingers. Mild patchy desquamation present. Skin is xerotic. No pustules, urticaria, scaling present. No erythema, edema, warmth present.   Neurological:      Mental Status: He is alert and oriented to person, place, and time.      Sensory: No sensory deficit.      Motor: No weakness.      Coordination: Coordination normal.      Gait: " Gait normal.      Deep Tendon Reflexes: Reflexes normal.         Assessment/Plan:     Diagnosis and associated orders:   1. Rash  - TRIAMCINOLONE ACETONIDE, TOP, 0.05 % Ointment; Apply 1 Application topically 2 times a day as needed (rash) for up to 7 days.  Dispense: 110 g; Refill: 0    2. Acute bilateral low back pain without sciatica  - methylPREDNISolone (MEDROL DOSEPAK) 4 MG Tablet Therapy Pack; Follow schedule on package instructions.  Dispense: 21 Tablet; Refill: 0    Other orders  - ALPRAZolam (XANAX) 0.25 MG Tab; Take 1 tablet 3 times a day by oral route as needed.        Comments/MDM:     Rash is likely dyshidrotic eczema.  Recommend: Wash hands with unscented soap, pat dry apply thin layer of steroid cream twice a day, apply liberal use of skin emollients.  Discussed occlusive dressing.   Continue with antihistamines, calamine lotion, Benadryl cream for itching.     Acute back pain without radiculopathy  No red flags, no imaging obtained today  Tylenol/NSAIDs, ice pack/heat packs, lidocaine patches, Voltaren gel.  Physical activity as tolerated, encouraged patient to stay active  Reinforced good lifting techniques  Printed back exercises handout (See avs)  Return to  if no improvement in 1 week         Please note that this dictation was created using voice recognition software. I have made a reasonable attempt to correct obvious errors, but I expect that there are errors of grammar and possibly content that I did not discover before finalizing the note.    This note was electronically signed by Roopa Zhou, DNP, APRN, FNP-C

## 2023-07-24 NOTE — LETTER
July 24, 2023    To Whom It May Concern:         This is confirmation that Alan Jeanmarie Prabhakar Jr. attended his scheduled appointment with MILI Ulrich on 7/24/23.    He may return to work on 07/25/23.         If you have any questions please do not hesitate to call me at the phone number listed below.    Sincerely,          GEORGE Ulrich.  [phone]

## 2023-12-16 ENCOUNTER — OFFICE VISIT (OUTPATIENT)
Dept: URGENT CARE | Facility: PHYSICIAN GROUP | Age: 63
End: 2023-12-16
Payer: COMMERCIAL

## 2023-12-16 ENCOUNTER — TELEPHONE (OUTPATIENT)
Dept: URGENT CARE | Facility: PHYSICIAN GROUP | Age: 63
End: 2023-12-16

## 2023-12-16 ENCOUNTER — HOSPITAL ENCOUNTER (OUTPATIENT)
Dept: RADIOLOGY | Facility: MEDICAL CENTER | Age: 63
End: 2023-12-16
Payer: COMMERCIAL

## 2023-12-16 VITALS
TEMPERATURE: 97.9 F | DIASTOLIC BLOOD PRESSURE: 82 MMHG | WEIGHT: 250 LBS | HEART RATE: 98 BPM | SYSTOLIC BLOOD PRESSURE: 130 MMHG | BODY MASS INDEX: 31.08 KG/M2 | HEIGHT: 75 IN | RESPIRATION RATE: 16 BRPM | OXYGEN SATURATION: 98 %

## 2023-12-16 DIAGNOSIS — L03.221 CELLULITIS OF NECK: ICD-10-CM

## 2023-12-16 DIAGNOSIS — R22.1 NECK SWELLING: ICD-10-CM

## 2023-12-16 PROCEDURE — 3075F SYST BP GE 130 - 139MM HG: CPT

## 2023-12-16 PROCEDURE — 76536 US EXAM OF HEAD AND NECK: CPT

## 2023-12-16 PROCEDURE — 3079F DIAST BP 80-89 MM HG: CPT

## 2023-12-16 PROCEDURE — 99214 OFFICE O/P EST MOD 30 MIN: CPT

## 2023-12-16 RX ORDER — CLINDAMYCIN HYDROCHLORIDE 300 MG/1
300 CAPSULE ORAL 3 TIMES DAILY
Qty: 21 CAPSULE | Refills: 0 | Status: SHIPPED | OUTPATIENT
Start: 2023-12-16 | End: 2023-12-16

## 2023-12-16 RX ORDER — CLINDAMYCIN HYDROCHLORIDE 300 MG/1
300 CAPSULE ORAL 3 TIMES DAILY
Qty: 21 CAPSULE | Refills: 0 | Status: SHIPPED
Start: 2023-12-16 | End: 2023-12-23

## 2023-12-16 NOTE — PROGRESS NOTES
"Subjective:   Alan Prabhakar Jr. is a very pleasant 63 y.o. male who presents for:    Chief Complaint   Patient presents with    Jaw Pain     Swelling Lt side x1d       HPI:    The patient reports that last night he developed jaw pain on the left side of the face. No injury to the area. He reports that the pain and swelling \"came on out of the blue.\" Denies chest pain, new back pain, arm discomfort, diaphoresis. Hurts to chew on the LS. Denies difficulty swallowing, SOB, mastoid tenderness, ear pain.     ROS:    Review of Systems   Constitutional:  Negative for chills, fever and malaise/fatigue.   HENT:  Negative for congestion, sinus pain and sore throat.         Jaw pain   Respiratory:  Negative for stridor.    All other systems reviewed and are negative.      Medications:      Current Outpatient Medications   Medication Sig    losartan-hydrochlorothiazide (HYZAAR) 100-25 MG per tablet Take 1 Tablet by mouth every day.    ALPRAZolam (XANAX) 0.25 MG Tab Take 1 tablet 3 times a day by oral route as needed. (Patient not taking: Reported on 12/16/2023)    methylPREDNISolone (MEDROL DOSEPAK) 4 MG Tablet Therapy Pack Follow schedule on package instructions.       Allergies:     Allergies   Allergen Reactions    Codeine Hives and Nausea    Codeine Rash    Hydrocodone Itching    Pcn [Penicillins] Unspecified     Pt states \"It's a childhood allergies\".        Problem List:     Patient Active Problem List   Diagnosis    Smoker    Asthma    Hypertension    Anxiety    Chronic lower back pain    Allergic rhinitis    BMI 30.0-30.9,adult    Tubular adenoma of colon    Pain    Pain of upper abdomen    Gastroesophageal reflux disease without esophagitis    Hiatal hernia    Nephrolithiasis    COPD (chronic obstructive pulmonary disease) ([facility])    Cigarette smoker    Obesity (BMI 30-39.9)    Cutaneous skin tags       Surgical History:    Past Surgical History:   Procedure Laterality Date    PB LAMINOTOMY,LUMBAR DISK,1 INTRSP "  11/2/2021    Procedure: LAMINECTOMY, SPINE, LUMBAR, WITH DISCECTOMY - L3-5 WITH MEDIAL FACETECTOMY;  Surgeon: Lon Pichardo M.D.;  Location: SURGERY [facility];  Service: Neurosurgery    FORAMINOTOMY  11/2/2021    Procedure: FORAMINOTOMY, SPINE;  Surgeon: Lon Pichardo M.D.;  Location: SURGERY [facility];  Service: Neurosurgery    KNEE ARTHROSCOPY  6/19/2013    Performed by Jim Davila M.D. at SURGERY [facility]    MENISCECTOMY, KNEE, MEDIAL  6/19/2013    Performed by Jim Davila M.D. at SURGERY [facility]    INJ,EPIDURAL/LUMB/SAC SINGLE  3/5/2012    Performed by GIAN ESCOBAR at SURGERY [facility]    TONSILLECTOMY  2004    SHOULDER ARTHROSCOPY  2004    left    KNEE ARTHROSCOPY  [id number]    left       Past Social Hx:     Social History     Socioeconomic History    Marital status:     Highest education level: Some college, no degree   Tobacco Use    Smoking status: Every Day     Current packs/day: 0.50     Average packs/day: 0.5 packs/day for 46.3 years (23.1 ttl pk-yrs)     Types: Cigarettes, Cigars     Start date: 9/10/1977    Smokeless tobacco: Former    Tobacco comments:     from 2 pack decreased to pack a day.  Started at 17yo. Patient is smoking one cigar. 10/26-pt down to 1/2 pack.   Vaping Use    Vaping Use: Never used   Substance and Sexual Activity    Alcohol use: Yes     Alcohol/week: 12.6 - 13.2 oz     Types: 1 - 2 Cans of beer, 20 Standard drinks or equivalent per week     Comment: 8 drinks/week    Drug use: No    Sexual activity: Yes     Comment:      Social Determinants of Health     Financial Resource Strain: Low Risk  (11/3/2021)    Overall Financial Resource Strain (CARDIA)     Difficulty of Paying Living Expenses: Not very hard   Food Insecurity: No Food Insecurity (11/3/2021)    Hunger Vital Sign     Worried About Running Out of Food in the Last Year: Never true     Ran Out of Food in the Last Year: Never true   Transportation Needs: No  Transportation Needs (11/3/2021)    PRAPARE - Transportation     Lack of Transportation (Medical): No     Lack of Transportation (Non-Medical): No   Physical Activity: Sufficiently Active (11/3/2021)    Exercise Vital Sign     Days of Exercise per Week: 5 days     Minutes of Exercise per Session: 60 min   Stress: No Stress Concern Present (11/3/2021)    [employer] - Occupational Stress Questionnaire     Feeling of Stress : Only a little   Social Connections: Moderately Integrated (11/3/2021)    Social Connection and Isolation Panel [NHANES]     Frequency of Communication with Friends and Family: More than three times a week     Frequency of Social Gatherings with Friends and Family: More than three times a week     Attends Yazidi Services: 1 to 4 times per year     Active Member of Clubs or Organizations: No     Attends Club or Organization Meetings: Never     Marital Status:    Housing Stability: Low Risk  (11/3/2021)    Housing Stability Vital Sign     Unable to Pay for Housing in the Last Year: No     Number of Places Lived in the Last Year: 1     Unstable Housing in the Last Year: No        Past Family Hx:      Family History   Problem Relation Age of Onset    Lung Disease Mother     Colon Cancer Father     Hypertension Other        Problem list, medications, and allergies reviewed by myself today in Epic.     Objective:     Vitals:    12/16/23 1332   BP: 130/82   Pulse: 98   Resp: 16   Temp: 36.6 °C (97.9 °F)   SpO2: 98%       Physical Exam  Vitals reviewed.   Constitutional:       General: He is not in acute distress.     Appearance: Normal appearance. He is not ill-appearing, toxic-appearing or diaphoretic.   HENT:      Head: Normocephalic and atraumatic.      Jaw: There is normal jaw occlusion. Tenderness and pain on movement present. No trismus, swelling or malocclusion.      Salivary Glands: Left salivary gland is not diffusely enlarged or tender.        Right Ear:  Tympanic membrane, ear canal and external ear normal.      Left Ear: Tympanic membrane, ear canal and external ear normal.   Eyes:      Extraocular Movements: Extraocular movements intact.      Pupils: Pupils are equal, round, and reactive to light.   Neck:        Comments: Mild swelling on the left lateral aspect of the neck distal to the auricle of the left ear.  There is a small amount of erythema and the area is without fluctuance or induration.  No lymphadenopathy present.  Cardiovascular:      Pulses: Normal pulses.      Heart sounds: Normal heart sounds.   Pulmonary:      Effort: Pulmonary effort is normal.   Musculoskeletal:         General: Normal range of motion.      Cervical back: Full passive range of motion without pain and normal range of motion.   Lymphadenopathy:      Cervical: No cervical adenopathy.      Left cervical: No superficial, deep or posterior cervical adenopathy.   Skin:     General: Skin is warm and dry.   Neurological:      General: No focal deficit present.      Mental Status: He is alert and oriented to person, place, and time. Mental status is at baseline.   Psychiatric:         Mood and Affect: Mood normal.         Behavior: Behavior normal.         Thought Content: Thought content normal.         Judgment: Judgment normal.             12/16/2023 2:19 PM     HISTORY/REASON FOR EXAM:  Neck pain and swelling        TECHNIQUE/EXAM DESCRIPTION:  Ultrasound of the soft tissues of the head and neck.     COMPARISON:  None     FINDINGS:  Focused ultrasound of the area of concern bilateral neck demonstrates no mass, fluid collection or hematoma.     There are normal-appearing lymph nodes seen. Is a technologist noted prominent but symmetric parotid glands. There is no increased vascularity seen.     IMPRESSION:     No definite sonographic abnormality. No definite focal fluid collection.     If continued clinical concern, contrast-enhanced CT of the neck is suggested.    Assessment/Plan:      Diagnosis and associated orders:     1. Neck swelling  - US-SOFT TISSUES OF HEAD - NECK; Future    2. Cellulitis of neck  - clindamycin (CLEOCIN) 300 MG Cap; Take 1 Capsule by mouth 3 times a day for 7 days.  Dispense: 21 Capsule; Refill: 0          Comments/MDM:     At the time of physical exam, the patient is well-appearing, afebrile, VSS, and has no difficulty swallowing.  Denies shortness of breath.   Stat ultrasound of neck demonstrates no definite sonographic abnormality or focal fluid collection  Patient called and made aware of this result.  I engaged in a comprehensive conversation with the patient regarding outpatient versus emergency department treatment options.  At this time, he would like to proceed with outpatient antibiotic therapy with strict ED precautions in the event that the pain and swelling on the left side of his jaw/neck worsens.  Prescription for clindamycin sent to patient's preferred pharmacy  Supportive measures discussed.  Encouraged cool compresses to the area, Tylenol/ibuprofen as needed for discomfort  Patient to follow-up with primary care provider         All questions answered. Patient verbalized understanding and is in agreement with this plan of care.     If symptoms are worsening or not improving in 3-5 days, follow-up with PCP or return to UC. Differential diagnosis, natural history, and supportive care discussed. AVS handout given and reviewed with patient. Patient educated on red flags and when to seek treatment back in ED or UC.     I personally reviewed prior external notes and test results pertinent to today's visit.  I have independently reviewed and interpreted all diagnostics ordered during this urgent care visit.     This dictation has been created using voice recognition software. The accuracy of the dictation is limited by the abilities of the software. I expect there may be some errors of grammar and possibly content. I made every attempt to manually correct  the errors within my dictation. However, errors related to voice recognition software may still exist and should be interpreted within the appropriate context.    This note was electronically signed by MILI Coleman

## 2023-12-17 NOTE — TELEPHONE ENCOUNTER
Comprehensive conversation with patient regarding US results.  Discussed differential, including possible deep space infection given the sudden onset of neck swelling, pain, redness  I engaged in shared decision making with the patient and he would like to proceed with ABX therapy with strict ED precautions in place in the event that neck swelling, pain, or redness worsens.   Patient denies difficulty swallowing, SOB, mastoid tenderness on PE, fever, chills  Clindamycin sent to patient's preferred pharmacy. He was encouraged to start the ABX tonight  All questions answered

## 2023-12-21 ASSESSMENT — ENCOUNTER SYMPTOMS
CHILLS: 0
FEVER: 0
SORE THROAT: 0
STRIDOR: 0
SINUS PAIN: 0

## 2024-08-09 ENCOUNTER — APPOINTMENT (OUTPATIENT)
Dept: URGENT CARE | Facility: PHYSICIAN GROUP | Age: 64
End: 2024-08-09
Payer: COMMERCIAL

## 2024-08-22 ENCOUNTER — HOSPITAL ENCOUNTER (OUTPATIENT)
Dept: RADIOLOGY | Facility: MEDICAL CENTER | Age: 64
End: 2024-08-22
Payer: COMMERCIAL

## 2024-08-22 ENCOUNTER — OFFICE VISIT (OUTPATIENT)
Dept: URGENT CARE | Facility: PHYSICIAN GROUP | Age: 64
End: 2024-08-22
Payer: COMMERCIAL

## 2024-08-22 VITALS
HEART RATE: 86 BPM | OXYGEN SATURATION: 98 % | WEIGHT: 247.8 LBS | BODY MASS INDEX: 30.81 KG/M2 | SYSTOLIC BLOOD PRESSURE: 128 MMHG | HEIGHT: 75 IN | RESPIRATION RATE: 14 BRPM | DIASTOLIC BLOOD PRESSURE: 74 MMHG | TEMPERATURE: 98 F

## 2024-08-22 DIAGNOSIS — S62.111A CLOSED DISPLACED FRACTURE OF TRIQUETRUM OF RIGHT WRIST, INITIAL ENCOUNTER: ICD-10-CM

## 2024-08-22 PROCEDURE — 3074F SYST BP LT 130 MM HG: CPT

## 2024-08-22 PROCEDURE — 3078F DIAST BP <80 MM HG: CPT

## 2024-08-22 PROCEDURE — 73110 X-RAY EXAM OF WRIST: CPT | Mod: LT

## 2024-08-22 PROCEDURE — 73110 X-RAY EXAM OF WRIST: CPT | Mod: RT

## 2024-08-22 PROCEDURE — 99213 OFFICE O/P EST LOW 20 MIN: CPT

## 2024-08-23 NOTE — PROGRESS NOTES
"Subjective:   Alan Prabhakar Jr. is a 63 y.o. male who presents for Wrist Injury (Fell two months ago both wrists had swelling and pain swelling is gone down but pain is still there)      Wrist Injury   The incident occurred more than 1 week ago. The incident occurred at home. The injury mechanism was a fall. The pain is present in the left wrist and right wrist. The quality of the pain is described as aching. The pain does not radiate. The pain is moderate. The pain has been Fluctuating since the incident. Pertinent negatives include no chest pain. Nothing aggravates the symptoms. He has tried ice, NSAIDs and acetaminophen for the symptoms. The treatment provided mild relief.       Review of Systems   Constitutional:  Negative for chills, fever and malaise/fatigue.   HENT:  Negative for congestion, ear pain, hearing loss and sore throat.    Respiratory:  Negative for cough and shortness of breath.    Cardiovascular:  Negative for chest pain.   Gastrointestinal:  Negative for abdominal pain, diarrhea, nausea and vomiting.   Genitourinary:  Negative for dysuria.   Musculoskeletal:  Positive for falls and joint pain (Bilateral wrists right greater than left after a fall 2 months ago). Negative for myalgias.   Skin:  Negative for rash.   Neurological:  Negative for headaches.       Medications, Allergies, and current problem list reviewed today in Epic.     Objective:     /74   Pulse 86   Temp 36.7 °C (98 °F) (Temporal)   Resp 14   Ht 1.905 m (6' 3\")   Wt 112 kg (247 lb 12.8 oz)   SpO2 98%     Physical Exam  Vitals and nursing note reviewed.   Constitutional:       General: He is not in acute distress.     Appearance: Normal appearance. He is not ill-appearing.   HENT:      Head: Normocephalic and atraumatic.      Right Ear: Tympanic membrane normal.      Left Ear: Tympanic membrane normal.      Nose: Nose normal.      Mouth/Throat:      Mouth: Mucous membranes are moist.      Pharynx: Oropharynx " is clear.   Eyes:      Conjunctiva/sclera: Conjunctivae normal.      Pupils: Pupils are equal, round, and reactive to light.   Cardiovascular:      Rate and Rhythm: Normal rate.      Heart sounds: Normal heart sounds.   Pulmonary:      Effort: Pulmonary effort is normal.      Breath sounds: Normal breath sounds.   Abdominal:      General: Abdomen is flat.      Palpations: Abdomen is soft.   Musculoskeletal:      Right wrist: Tenderness present. No swelling, deformity, bony tenderness or crepitus. Normal range of motion. Normal pulse.      Left wrist: Tenderness present. No swelling, deformity or crepitus. Normal range of motion. Normal pulse.        Arms:    Skin:     General: Skin is warm and dry.      Capillary Refill: Capillary refill takes less than 2 seconds.   Neurological:      Mental Status: He is alert and oriented to person, place, and time.   Psychiatric:         Mood and Affect: Mood normal.         Behavior: Behavior normal.       RADIOLOGY RESULTS   DX-WRIST-COMPLETE 3+ LEFT    Result Date: 8/22/2024 8/22/2024 6:08 PM HISTORY/REASON FOR EXAM:  Pain/Deformity Following Trauma; FOOSH. TECHNIQUE/EXAM DESCRIPTION AND NUMBER OF VIEWS:  4 views of the LEFT wrist. COMPARISON: None FINDINGS: There is no definite fracture or dislocation. There is mild osteoarthrosis.     No radiographic evidence of acute traumatic injury.    DX-WRIST-COMPLETE 3+ RIGHT    Result Date: 8/22/2024 8/22/2024 6:08 PM HISTORY/REASON FOR EXAM:  Pain/Deformity Following Trauma; FOOSH. TECHNIQUE/EXAM DESCRIPTION AND NUMBER OF VIEWS:  4 views of the RIGHT wrist. COMPARISON: Right hand radiographs 8/7/2018 FINDINGS: Displaced dorsal triquetral fracture visible on the lateral projection. No additional fracture detected. No dislocation.     Displaced dorsal triquetral fracture visible on the lateral projection.             Assessment/Plan:       1. Closed displaced fracture of triquetrum of right wrist, initial encounter  DX-WRIST-COMPLETE  3+ LEFT    DX-WRIST-COMPLETE 3+ RIGHT    Referral to Orthopedics    Wrist Splint        After assessment patient did have displaced dorsal cortical fracture visible on the lateral projection of his right wrist.  Patient was provided a ulnar gutter Velcro wrist splint at this time and with referred to orthopedics.  Patient was instructed to continue use of over-the-counter analgesics and ice area as needed.  Patient to monitor for any worsening signs and symptoms of any other concerns patient was instructed to return to urgent care or emergency department for further management.    Differential diagnosis, natural history, and supportive care discussed. We also reviewed side effects of medication including allergic response, GI upset, tendon injury, rash, sedation etc. Patient and/or guardian voices understanding.      Advised the patient to follow-up with the primary care physician for recheck, reevaluation, and consideration of further management.    I personally reviewed prior external notes and test results pertinent to today's visit as well as additional imaging and testing completed in clinic today.     Please note that this dictation was created using voice recognition software. I have made every reasonable attempt to correct obvious errors, but I expect that there are errors of grammar and possibly content that I did not discover before finalizing the note.    This note was electronically signed by MILI Jenkins

## 2024-08-25 ASSESSMENT — ENCOUNTER SYMPTOMS
MYALGIAS: 0
FALLS: 1
ABDOMINAL PAIN: 0
HEADACHES: 0
SHORTNESS OF BREATH: 0
COUGH: 0
CHILLS: 0
VOMITING: 0
SORE THROAT: 0
FEVER: 0
NAUSEA: 0
DIARRHEA: 0

## 2024-10-23 ENCOUNTER — APPOINTMENT (OUTPATIENT)
Dept: RADIOLOGY | Facility: MEDICAL CENTER | Age: 64
End: 2024-10-23
Attending: EMERGENCY MEDICINE
Payer: COMMERCIAL

## 2024-10-23 ENCOUNTER — HOSPITAL ENCOUNTER (EMERGENCY)
Facility: MEDICAL CENTER | Age: 64
End: 2024-10-24
Attending: EMERGENCY MEDICINE
Payer: COMMERCIAL

## 2024-10-23 DIAGNOSIS — G44.329 CHRONIC POST-TRAUMATIC HEADACHE, NOT INTRACTABLE: ICD-10-CM

## 2024-10-23 LAB
ALBUMIN SERPL BCP-MCNC: 4.2 G/DL (ref 3.2–4.9)
ALBUMIN/GLOB SERPL: 1.4 G/DL
ALP SERPL-CCNC: 72 U/L (ref 30–99)
ALT SERPL-CCNC: 29 U/L (ref 2–50)
ANION GAP SERPL CALC-SCNC: 11 MMOL/L (ref 7–16)
AST SERPL-CCNC: 23 U/L (ref 12–45)
BASOPHILS # BLD AUTO: 0.7 % (ref 0–1.8)
BASOPHILS # BLD: 0.06 K/UL (ref 0–0.12)
BILIRUB SERPL-MCNC: 0.4 MG/DL (ref 0.1–1.5)
BUN SERPL-MCNC: 13 MG/DL (ref 8–22)
CALCIUM ALBUM COR SERPL-MCNC: 9.4 MG/DL (ref 8.5–10.5)
CALCIUM SERPL-MCNC: 9.6 MG/DL (ref 8.4–10.2)
CHLORIDE SERPL-SCNC: 100 MMOL/L (ref 96–112)
CO2 SERPL-SCNC: 21 MMOL/L (ref 20–33)
CREAT SERPL-MCNC: 0.81 MG/DL (ref 0.5–1.4)
EOSINOPHIL # BLD AUTO: 0.11 K/UL (ref 0–0.51)
EOSINOPHIL NFR BLD: 1.2 % (ref 0–6.9)
ERYTHROCYTE [DISTWIDTH] IN BLOOD BY AUTOMATED COUNT: 46.3 FL (ref 35.9–50)
GFR SERPLBLD CREATININE-BSD FMLA CKD-EPI: 99 ML/MIN/1.73 M 2
GLOBULIN SER CALC-MCNC: 3 G/DL (ref 1.9–3.5)
GLUCOSE SERPL-MCNC: 107 MG/DL (ref 65–99)
HCT VFR BLD AUTO: 49.7 % (ref 42–52)
HGB BLD-MCNC: 17.2 G/DL (ref 14–18)
IMM GRANULOCYTES # BLD AUTO: 0.04 K/UL (ref 0–0.11)
IMM GRANULOCYTES NFR BLD AUTO: 0.4 % (ref 0–0.9)
LYMPHOCYTES # BLD AUTO: 2.13 K/UL (ref 1–4.8)
LYMPHOCYTES NFR BLD: 23.3 % (ref 22–41)
MCH RBC QN AUTO: 32 PG (ref 27–33)
MCHC RBC AUTO-ENTMCNC: 34.6 G/DL (ref 32.3–36.5)
MCV RBC AUTO: 92.4 FL (ref 81.4–97.8)
MONOCYTES # BLD AUTO: 1.32 K/UL (ref 0–0.85)
MONOCYTES NFR BLD AUTO: 14.4 % (ref 0–13.4)
NEUTROPHILS # BLD AUTO: 5.49 K/UL (ref 1.82–7.42)
NEUTROPHILS NFR BLD: 60 % (ref 44–72)
NRBC # BLD AUTO: 0 K/UL
NRBC BLD-RTO: 0 /100 WBC (ref 0–0.2)
PLATELET # BLD AUTO: 271 K/UL (ref 164–446)
PMV BLD AUTO: 9.8 FL (ref 9–12.9)
POTASSIUM SERPL-SCNC: 4 MMOL/L (ref 3.6–5.5)
PROT SERPL-MCNC: 7.2 G/DL (ref 6–8.2)
RBC # BLD AUTO: 5.38 M/UL (ref 4.7–6.1)
SODIUM SERPL-SCNC: 132 MMOL/L (ref 135–145)
WBC # BLD AUTO: 9.2 K/UL (ref 4.8–10.8)

## 2024-10-23 PROCEDURE — 36415 COLL VENOUS BLD VENIPUNCTURE: CPT

## 2024-10-23 PROCEDURE — 700102 HCHG RX REV CODE 250 W/ 637 OVERRIDE(OP): Performed by: EMERGENCY MEDICINE

## 2024-10-23 PROCEDURE — 85025 COMPLETE CBC W/AUTO DIFF WBC: CPT

## 2024-10-23 PROCEDURE — 700111 HCHG RX REV CODE 636 W/ 250 OVERRIDE (IP): Performed by: EMERGENCY MEDICINE

## 2024-10-23 PROCEDURE — 96365 THER/PROPH/DIAG IV INF INIT: CPT

## 2024-10-23 PROCEDURE — 99284 EMERGENCY DEPT VISIT MOD MDM: CPT

## 2024-10-23 PROCEDURE — 80053 COMPREHEN METABOLIC PANEL: CPT

## 2024-10-23 PROCEDURE — A9270 NON-COVERED ITEM OR SERVICE: HCPCS | Performed by: EMERGENCY MEDICINE

## 2024-10-23 PROCEDURE — 70450 CT HEAD/BRAIN W/O DYE: CPT

## 2024-10-23 PROCEDURE — 96375 TX/PRO/DX INJ NEW DRUG ADDON: CPT

## 2024-10-23 RX ORDER — KETOROLAC TROMETHAMINE 15 MG/ML
15 INJECTION, SOLUTION INTRAMUSCULAR; INTRAVENOUS ONCE
Status: COMPLETED | OUTPATIENT
Start: 2024-10-23 | End: 2024-10-23

## 2024-10-23 RX ORDER — ACETAMINOPHEN 500 MG
1000 TABLET ORAL ONCE
Status: COMPLETED | OUTPATIENT
Start: 2024-10-23 | End: 2024-10-23

## 2024-10-23 RX ORDER — METOCLOPRAMIDE HYDROCHLORIDE 5 MG/ML
10 INJECTION INTRAMUSCULAR; INTRAVENOUS ONCE
Status: COMPLETED | OUTPATIENT
Start: 2024-10-23 | End: 2024-10-23

## 2024-10-23 RX ORDER — DIPHENHYDRAMINE HYDROCHLORIDE 50 MG/ML
25 INJECTION INTRAMUSCULAR; INTRAVENOUS ONCE
Status: COMPLETED | OUTPATIENT
Start: 2024-10-23 | End: 2024-10-23

## 2024-10-23 RX ORDER — MAGNESIUM SULFATE HEPTAHYDRATE 40 MG/ML
2 INJECTION, SOLUTION INTRAVENOUS ONCE
Status: COMPLETED | OUTPATIENT
Start: 2024-10-23 | End: 2024-10-24

## 2024-10-23 RX ADMIN — ACETAMINOPHEN 1000 MG: 500 TABLET ORAL at 23:15

## 2024-10-23 RX ADMIN — KETOROLAC TROMETHAMINE 15 MG: 15 INJECTION, SOLUTION INTRAMUSCULAR; INTRAVENOUS at 23:15

## 2024-10-23 RX ADMIN — DIPHENHYDRAMINE HYDROCHLORIDE 25 MG: 50 INJECTION, SOLUTION INTRAMUSCULAR; INTRAVENOUS at 23:15

## 2024-10-23 RX ADMIN — MAGNESIUM SULFATE HEPTAHYDRATE 2 G: 2 INJECTION, SOLUTION INTRAVENOUS at 23:20

## 2024-10-23 RX ADMIN — METOCLOPRAMIDE HYDROCHLORIDE 10 MG: 5 INJECTION INTRAMUSCULAR; INTRAVENOUS at 23:15

## 2024-10-23 ASSESSMENT — PAIN DESCRIPTION - PAIN TYPE: TYPE: ACUTE PAIN

## 2024-10-24 VITALS
BODY MASS INDEX: 30.04 KG/M2 | OXYGEN SATURATION: 97 % | DIASTOLIC BLOOD PRESSURE: 77 MMHG | HEART RATE: 71 BPM | RESPIRATION RATE: 18 BRPM | HEIGHT: 75 IN | TEMPERATURE: 98 F | WEIGHT: 241.62 LBS | SYSTOLIC BLOOD PRESSURE: 122 MMHG

## 2024-10-24 PROCEDURE — 96366 THER/PROPH/DIAG IV INF ADDON: CPT

## (undated) DEVICE — DRAPE STRLE REG TOWEL 18X24 - (10/BX 4BX/CA)"

## (undated) DEVICE — TUBING C&T SET FLYING LEADS DRAIN TUBING (10EA/BX)

## (undated) DEVICE — SUTURE ETHILON 2-0 FSLX 30 (36PK/BX)"

## (undated) DEVICE — CANISTER SUCTION 3000ML MECHANICAL FILTER AUTO SHUTOFF MEDI-VAC NONSTERILE LF DISP  (40EA/CA)

## (undated) DEVICE — ARMREST CRADLE FOAM - (2PR/PK 12PR/CA)

## (undated) DEVICE — SYRINGE NON SAFETY 10 CC 20 GA X 1-1/2 IN (100/BX 4BX/CA)

## (undated) DEVICE — GLOVE BIOGEL PI INDICATOR SZ 6.0 SURGICAL PF LF -(200PR/CA)

## (undated) DEVICE — LACTATED RINGERS INJ. 500 ML - (24EA/CA)

## (undated) DEVICE — SODIUM CHL IRRIGATION 0.9% 1000ML (12EA/CA)

## (undated) DEVICE — GLOVE BIOGEL SZ 8 SURGICAL PF LTX - (50PR/BX 4BX/CA)

## (undated) DEVICE — BAG SPONGE COUNT 10.25 X 32 - BLUE (250/CA)

## (undated) DEVICE — BLADE SURGICAL CLIPPER - (50EA/CA)

## (undated) DEVICE — GLOVE BIOGEL INDICATOR SZ 8 SURGICAL PF LTX - (50/BX 4BX/CA)

## (undated) DEVICE — LACTATED RINGERS INJ 1000 ML - (14EA/CA 60CA/PF)

## (undated) DEVICE — KIT EVACUATER 3 SPRING PVC LF 1/8 DRAIN SIZE (10EA/CA)"

## (undated) DEVICE — BOVIE BLADE COATED &INSULATED - 25/PK

## (undated) DEVICE — MASK ANESTHESIA ADULT  - (100/CA)

## (undated) DEVICE — SUTURE GENERAL

## (undated) DEVICE — SENSOR SPO2 NEO LNCS ADHESIVE (20/BX) SEE USER NOTES

## (undated) DEVICE — TOWEL STOP TIMEOUT SAFETY FLAG (40EA/CA)

## (undated) DEVICE — KIT ANESTHESIA W/CIRCUIT & 3/LT BAG W/FILTER (20EA/CA)

## (undated) DEVICE — GOWN SURGEONS LARGE - (32/CA)

## (undated) DEVICE — SET EXTENSION WITH 2 PORTS (48EA/CA) ***PART #2C8610 IS A SUBSTITUTE*****

## (undated) DEVICE — SUCTION INSTRUMENT YANKAUER BULBOUS TIP W/O VENT (50EA/CA)

## (undated) DEVICE — CHLORAPREP 26 ML APPLICATOR - ORANGE TINT(25/CA)

## (undated) DEVICE — SET LEADWIRE 5 LEAD BEDSIDE DISPOSABLE ECG (1SET OF 5/EA)

## (undated) DEVICE — DRESSING TRANSPARENT FILM TEGADERM 2.375 X 2.75"  (100EA/BX)"

## (undated) DEVICE — FORCEPS IRRIGATING 8 X 1.5MM (5EA/BX)

## (undated) DEVICE — GLOVE BIOGEL INDICATOR SZ 7SURGICAL PF LTX - (50/BX 4BX/CA)

## (undated) DEVICE — DRESSING POST OP BORDER 4 X 10 (5EA/BX)

## (undated) DEVICE — SLEEVE, VASO, THIGH, MED

## (undated) DEVICE — GLOVE BIOGEL PI ORTHO SZ 6 SURGICAL PF LF (40PR/BX)

## (undated) DEVICE — PROTECTOR ULNA NERVE - (36PR/CA)

## (undated) DEVICE — HEADREST PRONEVIEW LARGE - (10/CA)

## (undated) DEVICE — MIDAS LUBRICATOR DIFFUSER PACK (4EA/CA)

## (undated) DEVICE — SUTURE 0 VICRYL PLUS CT-1 - 8 X 18 INCH (12/BX)

## (undated) DEVICE — GLOVE SZ 6.5 BIOGEL PI MICRO - PF LF (50PR/BX)

## (undated) DEVICE — SUTURE 2-0 VICRYL PLUS CT-1 - 8 X 18 INCH(12/BX)

## (undated) DEVICE — NEPTUNE 4 PORT MANIFOLD - (20/PK)

## (undated) DEVICE — PACK NEURO - (2EA/CA)

## (undated) DEVICE — SPONGE GAUZESTER. 2X2 4-PL - (2/PK 50PK/BX 30BX/CS)

## (undated) DEVICE — TUBING CLEARLINK DUO-VENT - C-FLO (48EA/CA)

## (undated) DEVICE — ELECTRODE 850 FOAM ADHESIVE - HYDROGEL RADIOTRNSPRNT (50/PK)

## (undated) DEVICE — ELECTRODE DUAL RETURN W/ CORD - (50/PK)

## (undated) DEVICE — KIT SURGIFLO W/OUT THROMBIN - (6EA/CA)